# Patient Record
Sex: FEMALE | Race: WHITE | NOT HISPANIC OR LATINO | Employment: PART TIME | URBAN - METROPOLITAN AREA
[De-identification: names, ages, dates, MRNs, and addresses within clinical notes are randomized per-mention and may not be internally consistent; named-entity substitution may affect disease eponyms.]

---

## 2022-10-17 ENCOUNTER — OFFICE VISIT (OUTPATIENT)
Dept: INTERNAL MEDICINE | Facility: CLINIC | Age: 22
End: 2022-10-17

## 2022-10-17 VITALS
SYSTOLIC BLOOD PRESSURE: 118 MMHG | HEIGHT: 64 IN | HEART RATE: 68 BPM | TEMPERATURE: 97.1 F | DIASTOLIC BLOOD PRESSURE: 83 MMHG | WEIGHT: 179 LBS | BODY MASS INDEX: 30.56 KG/M2 | OXYGEN SATURATION: 100 %

## 2022-10-17 DIAGNOSIS — J31.0 CHRONIC RHINITIS: ICD-10-CM

## 2022-10-17 DIAGNOSIS — Z76.89 ENCOUNTER TO ESTABLISH CARE: Primary | ICD-10-CM

## 2022-10-17 PROBLEM — N94.6 DYSMENORRHEA: Status: ACTIVE | Noted: 2018-02-20

## 2022-10-17 PROBLEM — F41.9 ANXIETY: Status: ACTIVE | Noted: 2021-07-20

## 2022-10-17 PROBLEM — K21.9 GASTROESOPHAGEAL REFLUX DISEASE: Status: ACTIVE | Noted: 2018-02-20

## 2022-10-17 PROBLEM — L98.9 FINGER LESION: Status: ACTIVE | Noted: 2018-02-20

## 2022-10-17 PROBLEM — R20.0 RIGHT FACIAL NUMBNESS: Status: ACTIVE | Noted: 2018-02-21

## 2022-10-17 PROCEDURE — 99203 OFFICE O/P NEW LOW 30 MIN: CPT | Performed by: NURSE PRACTITIONER

## 2022-10-17 RX ORDER — FLUTICASONE PROPIONATE 50 MCG
2 SPRAY, SUSPENSION (ML) NASAL DAILY
Qty: 9.9 ML | Refills: 2 | Status: SHIPPED | OUTPATIENT
Start: 2022-10-17 | End: 2023-01-06

## 2022-10-17 RX ORDER — SERTRALINE HYDROCHLORIDE 100 MG/1
TABLET, FILM COATED ORAL
COMMUNITY
Start: 2022-09-02 | End: 2022-10-17

## 2022-10-17 RX ORDER — LORATADINE 10 MG/1
CAPSULE, LIQUID FILLED ORAL
COMMUNITY
End: 2022-10-17

## 2022-10-17 RX ORDER — NORETHINDRONE ACETATE AND ETHINYL ESTRADIOL 1; .02 MG/1; MG/1
TABLET ORAL
COMMUNITY
Start: 2021-05-03 | End: 2023-01-06

## 2022-10-17 RX ORDER — LEVOCETIRIZINE DIHYDROCHLORIDE 5 MG/1
5 TABLET, FILM COATED ORAL EVERY EVENING
Qty: 30 TABLET | Refills: 2 | Status: SHIPPED | OUTPATIENT
Start: 2022-10-17 | End: 2023-03-08

## 2022-10-17 NOTE — PROGRESS NOTES
Chief Complaint  Breathing Problem (Establish care new patient //Difficulty breathing out of nose )     Subjective:      History of Present Illness {CC  Problem List  Visit  Diagnosis   Encounters  Notes  Medications  Labs  Result Review Imaging  Media :23}     Susan Contreras presents to [facility] for:      She is new to me.   She has been seen at [facility] in the past.     PmHX: anxiety (zoloft in the past), allergies, GERD    She is here today due to chronic nasal congestion. This has been on going for years.   Lives with boyfriend that had 2 cats - now has 5 cats and she has been allergic to cats.   Denies fever, chills, HA, cough, no ear pain.    She has been taking claritin and using Arm and Hammer saline spray.   States it is difficult for her to breath at night.        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      Objective:      Physical Exam  Constitutional:       Appearance: Normal appearance.   HENT:      Nose: Rhinorrhea (clear) present.      Comments: Turbinates pale, boggy  No polyps seen distally      Mouth/Throat:      Mouth: Mucous membranes are dry.      Pharynx: Oropharynx is clear. No oropharyngeal exudate or posterior oropharyngeal erythema.   Eyes:      Conjunctiva/sclera: Conjunctivae normal.      Pupils: Pupils are equal, round, and reactive to light.   Cardiovascular:      Rate and Rhythm: Normal rate.      Pulses: Normal pulses.   Musculoskeletal:      Cervical back: Normal range of motion.   Lymphadenopathy:      Cervical: No cervical adenopathy.   Neurological:      Mental Status: She is alert and oriented to person, place, and time.        Result Review  Data Reviewed:{ Labs  Result Review  Imaging  Med Tab  Media :23}                Vital Signs:   /83 (BP Location:  "Left arm, Patient Position: Sitting, Cuff Size: Adult)   Pulse 68   Temp 97.1 °F (36.2 °C) (Temporal)   Ht 162.6 cm (64\")   Wt 81.2 kg (179 lb)   SpO2 100%   BMI 30.73 kg/m²         Requested Prescriptions     Signed Prescriptions Disp Refills   • levocetirizine (XYZAL) 5 MG tablet 30 tablet 2     Sig: Take 1 tablet by mouth Every Evening.   • fluticasone (Flonase) 50 MCG/ACT nasal spray 9.9 mL 2     Si sprays into the nostril(s) as directed by provider Daily.       Routine medications provided by this office will also be refilled via pharmacy request.       Current Outpatient Medications:   •  norethindrone-ethinyl estradiol (MICROGESTIN ) 1-20 MG-MCG per tablet, , Disp: , Rfl:   •  fluticasone (Flonase) 50 MCG/ACT nasal spray, 2 sprays into the nostril(s) as directed by provider Daily., Disp: 9.9 mL, Rfl: 2  •  levocetirizine (XYZAL) 5 MG tablet, Take 1 tablet by mouth Every Evening., Disp: 30 tablet, Rfl: 2     Assessment and Plan:      Assessment and Plan {CC Problem List  Visit Diagnosis  ROS  Review (Popup)  Health Maintenance  Quality  BestPractice  Medications  SmartSets  SnapShot Encounters  Media :23}     Problem List Items Addressed This Visit    None  Visit Diagnoses     Encounter to establish care    -  Primary    Chronic rhinitis        Relevant Medications    levocetirizine (XYZAL) 5 MG tablet    fluticasone (Flonase) 50 MCG/ACT nasal spray    Other Relevant Orders    Ambulatory Referral to ENT (Otolaryngology)        Will have her to take flonase and stop claritin and change to xyzal at bedtime.  And to wipe down nasal spray with alcohol.   I gave her a sample of benzedrex nasal decongestant.     Will refer to ENT for further evaluation.     Follow Up {Instructions Charge Capture  Follow-up Communications :23}     Return in about 3 months (around 2023) for Annual physical.      Patient was given instructions and counseling regarding her condition or for health " maintenance advice. Please see specific information pulled into the AVS if appropriate.    Teressaon disclaimer:   Much of this encounter note is an electronic transcription/translation of spoken language to printed text. The electronic translation of spoken language may permit erroneous, or at times, nonsensical words or phrases to be inadvertently transcribed; Although I have reviewed the note for such errors, some may still exist.     Additional Patient Counseling:       Patient Instructions       October is Breast Cancer Awareness Month  Each year more than 245,000 women get breast cancer in the [place].  Each year approximately 2,650 men are diagnosed with breast cancer.     Monitor your breast for changes  • Any change in the size or the shape of the breast  • Pain in any area of the breast  • Nipple discharge other than breast milk (including blood)  • A new lump in the breast or underarm  *Continue regular scheduled mammograms    Diet:    • Eat vegetables, fruits, whole grain, low-fat dairy, poultry, fish, beans, nontropical vegetable oils, and nuts, but low amounts of red meat (i.e., Mediterranean-style diet, DASH [Dietary Approaches to Stop Hypertension] diet).  • Limit sugary drinks and sweets.  • Limit saturated and trans fat to 5% to 6% of calories.  • Limit sodium intake to 2,400 mg daily (about one teaspoon table salt [kosher/sea salt have less sodium per teaspoon]).  • Fad diets will come and go.  Studies show that the most effective diet is one that you can continue long term.     Weight loss / Calorie Counting Apps:    • Lose It!   • MyFitness Pal   • Works great when you try it with a partner/ friend    Exercise:   • Engage in moderate-to-vigorous aerobic activity for at least 40 minutes (on average) three to four times each week.    Wearables:   • Activity tracker   • Step tracker: getting 7,500 steps daily can cut your cardiac risks by 44%     Bone Health:    • Https://www.nof.org/patients/treatment/nutrition/  • Routine weight bearing exercise    Vaccines:   • Flu vaccine every fall  • https://www.vaccinateyourfamily.org/        COVID booster recommended.   COVID resources: https://PriceMDs.comstatus.QWiPS/docxese71

## 2022-10-17 NOTE — PATIENT INSTRUCTIONS
October is Breast Cancer Awareness Month  Each year more than 245,000 women get breast cancer in the [place].  Each year approximately 2,650 men are diagnosed with breast cancer.     Monitor your breast for changes  Any change in the size or the shape of the breast  Pain in any area of the breast  Nipple discharge other than breast milk (including blood)  A new lump in the breast or underarm  *Continue regular scheduled mammograms    Diet:    Eat vegetables, fruits, whole grain, low-fat dairy, poultry, fish, beans, nontropical vegetable oils, and nuts, but low amounts of red meat (i.e., Mediterranean-style diet, DASH [Dietary Approaches to Stop Hypertension] diet).  Limit sugary drinks and sweets.  Limit saturated and trans fat to 5% to 6% of calories.  Limit sodium intake to 2,400 mg daily (about one teaspoon table salt [kosher/sea salt have less sodium per teaspoon]).  Fad diets will come and go.  Studies show that the most effective diet is one that you can continue long term.     Weight loss / Calorie Counting Apps:    Lose It!   [employer] Pal   Works great when you try it with a partner/ friend    Exercise:   Engage in moderate-to-vigorous aerobic activity for at least 40 minutes (on average) three to four times each week.    Wearables:   Activity tracker   Step tracker: getting 7,500 steps daily can cut your cardiac risks by 44%     Bone Health:   Https://www.nof.org/patients/treatment/nutrition/  Routine weight bearing exercise    Vaccines:   Flu vaccine every fall  https://www.vaccinateyourfamily.org/        COVID booster recommended.   COVID resources: https://govstatus.IvyDate/iwdwujs19

## 2022-11-02 ENCOUNTER — TELEPHONE (OUTPATIENT)
Dept: INTERNAL MEDICINE | Facility: CLINIC | Age: 22
End: 2022-11-02

## 2022-11-02 NOTE — TELEPHONE ENCOUNTER
UNABLE TO WARM TRANSFER    Caller: Susan Contreras    Relationship to patient: Self    Patient is needing: PATIENT IS CHECKING STATUS OF ENT REFERRAL. PLEASE ANNELISE [phone]

## 2023-01-06 ENCOUNTER — OFFICE VISIT (OUTPATIENT)
Dept: INTERNAL MEDICINE | Facility: CLINIC | Age: 23
End: 2023-01-06
Payer: COMMERCIAL

## 2023-01-06 VITALS
HEART RATE: 95 BPM | HEIGHT: 64 IN | SYSTOLIC BLOOD PRESSURE: 118 MMHG | DIASTOLIC BLOOD PRESSURE: 76 MMHG | OXYGEN SATURATION: 99 % | WEIGHT: 176.5 LBS | BODY MASS INDEX: 30.13 KG/M2

## 2023-01-06 DIAGNOSIS — K62.89 RECTAL CYST: Primary | ICD-10-CM

## 2023-01-06 PROBLEM — E66.9 OBESITY WITH BODY MASS INDEX 30 OR GREATER: Status: ACTIVE | Noted: 2023-01-06

## 2023-01-06 PROCEDURE — 99213 OFFICE O/P EST LOW 20 MIN: CPT | Performed by: NURSE PRACTITIONER

## 2023-01-06 RX ORDER — METRONIDAZOLE 7.5 MG/G
GEL VAGINAL
COMMUNITY
Start: 2022-11-30 | End: 2023-01-10

## 2023-01-06 RX ORDER — SULFAMETHOXAZOLE AND TRIMETHOPRIM 800; 160 MG/1; MG/1
1 TABLET ORAL 2 TIMES DAILY
Qty: 14 TABLET | Refills: 0 | Status: SHIPPED | OUTPATIENT
Start: 2023-01-06 | End: 2023-01-13

## 2023-01-06 RX ORDER — NORETHINDRONE ACETATE AND ETHINYL ESTRADIOL 1; .02 MG/1; MG/1
TABLET ORAL
COMMUNITY
Start: 2021-05-02 | End: 2023-01-10

## 2023-01-06 RX ORDER — AZELASTINE HYDROCHLORIDE 137 UG/1
SPRAY, METERED NASAL
COMMUNITY
Start: 2022-12-15

## 2023-01-06 NOTE — PROGRESS NOTES
Chief Complaint  Mass (2 weeks )     Subjective:      History of Present Illness {CC  Problem List  Visit  Diagnosis   Encounters  Notes  Medications  Labs  Result Review Imaging  Media :23}     Susan Contreras presents to [facility] for:          Feels bump above rectum: she has searched and has concern for pilonidal cyst.   No fever/ chills.   No change in BMs.      Her mother is in exam room and asks for her to be present.     Answers for HPI/ROS submitted by the patient on 1/2/2023  Please describe your symptoms.: I feel a bump in between the buttcrack, slightly above the butthole. It is causing a little discomfort and I am concerned about it.  Have you had these symptoms before?: No  How long have you been having these symptoms?: 1-2 weeks  What is the primary reason for your visit?: Other          I have reviewed patient's medical history, any new submitted information provided by patient or medical assistant and updated medical record.      Objective:      Physical Exam  Cardiovascular:      Rate and Rhythm: Normal rate.      Pulses: Normal pulses.   Genitourinary:      Neurological:      Mental Status: She is alert and oriented to person, place, and time.        Result Review  Data Reviewed:{ Labs  Result Review  Imaging  Med Tab  Media :23}                Vital Signs:   /76 (BP Location: Left arm, Patient Position: Sitting, Cuff Size: Adult)   Pulse 95   Ht 162.6 cm (64\")   Wt 80.1 kg (176 lb 8 oz)   SpO2 99%   BMI 30.30 kg/m²         Requested Prescriptions     Signed Prescriptions Disp Refills   • sulfamethoxazole-trimethoprim (Bactrim DS) 800-160 MG per tablet 14 tablet 0     Sig: Take 1 tablet by mouth 2 (Two) Times a Day for 7 days.       Routine medications provided by this office will also be refilled via pharmacy request.       Current Outpatient Medications:   •  Azelastine HCl 137 MCG/SPRAY solution, SPRAY 1 SPRAY EACH NOSTRIL TWICE  A DAY, Disp: , Rfl:   •  levocetirizine (XYZAL) 5 MG tablet, Take 1 tablet by mouth Every Evening., Disp: 30 tablet, Rfl: 2  •  metroNIDAZOLE (METROGEL) 0.75 % vaginal gel, INSERT 1 APPLICATORFUL VAGINALLY FOR 5 NIGHTS, Disp: , Rfl:   •  norethindrone-ethinyl estradiol (MICROGESTIN 1/20) 1-20 MG-MCG per tablet, , Disp: , Rfl:   •  sulfamethoxazole-trimethoprim (Bactrim DS) 800-160 MG per tablet, Take 1 tablet by mouth 2 (Two) Times a Day for 7 days., Disp: 14 tablet, Rfl: 0     Assessment and Plan:      Assessment and Plan {CC Problem List  Visit Diagnosis  ROS  Review (Popup)  Health Maintenance  Quality  BestPractice  Medications  SmartSets  SnapShot Encounters  Media :23}     Problem List Items Addressed This Visit    None  Visit Diagnoses     Rectal cyst    -  Primary    Relevant Medications    sulfamethoxazole-trimethoprim (Bactrim DS) 800-160 MG per tablet          Advised sitz bath.   Toilet hygiene: limit time on toilet   High fiber diet.       Follow Up {Instructions Charge Capture  Follow-up Communications :23}     Return if symptoms worsen or fail to improve, for Next scheduled follow up.      Patient was given instructions and counseling regarding her condition or for health maintenance advice. Please see specific information pulled into the AVS if appropriate.    Nahid disclaimer:   Much of this encounter note is an electronic transcription/translation of spoken language to printed text. The electronic translation of spoken language may permit erroneous, or at times, nonsensical words or phrases to be inadvertently transcribed; Although I have reviewed the note for such errors, some may still exist.     Additional Patient Counseling:       There are no Patient Instructions on file for this visit.

## 2023-01-10 DIAGNOSIS — J31.0 CHRONIC RHINITIS: ICD-10-CM

## 2023-01-10 RX ORDER — FLUTICASONE PROPIONATE 50 MCG
SPRAY, SUSPENSION (ML) NASAL
Qty: 16 ML | Refills: 2 | OUTPATIENT
Start: 2023-01-10

## 2023-01-17 ENCOUNTER — OFFICE VISIT (OUTPATIENT)
Dept: INTERNAL MEDICINE | Facility: CLINIC | Age: 23
End: 2023-01-17
Payer: COMMERCIAL

## 2023-01-17 VITALS
OXYGEN SATURATION: 99 % | BODY MASS INDEX: 30.3 KG/M2 | HEIGHT: 64 IN | HEART RATE: 70 BPM | TEMPERATURE: 98.2 F | DIASTOLIC BLOOD PRESSURE: 70 MMHG | SYSTOLIC BLOOD PRESSURE: 120 MMHG | WEIGHT: 177.5 LBS

## 2023-01-17 DIAGNOSIS — Z11.59 NEED FOR HEPATITIS C SCREENING TEST: ICD-10-CM

## 2023-01-17 DIAGNOSIS — Z13.220 ENCOUNTER FOR LIPID SCREENING FOR CARDIOVASCULAR DISEASE: ICD-10-CM

## 2023-01-17 DIAGNOSIS — Z00.00 ANNUAL PHYSICAL EXAM: Primary | ICD-10-CM

## 2023-01-17 DIAGNOSIS — Z13.6 ENCOUNTER FOR LIPID SCREENING FOR CARDIOVASCULAR DISEASE: ICD-10-CM

## 2023-01-17 DIAGNOSIS — Z13.228 ENCOUNTER FOR SCREENING FOR METABOLIC DISORDER: ICD-10-CM

## 2023-01-17 DIAGNOSIS — Z13.6 SCREENING FOR HYPERTENSION: ICD-10-CM

## 2023-01-17 PROBLEM — Z91.09 ENVIRONMENTAL ALLERGIES: Status: ACTIVE | Noted: 2023-01-17

## 2023-01-17 PROCEDURE — 99395 PREV VISIT EST AGE 18-39: CPT | Performed by: NURSE PRACTITIONER

## 2023-01-17 RX ORDER — NORETHINDRONE ACETATE AND ETHINYL ESTRADIOL 1; .02 MG/1; MG/1
TABLET ORAL
COMMUNITY
Start: 2021-05-03

## 2023-01-17 NOTE — PROGRESS NOTES
Chief Complaint  Annual Exam     Subjective:      History of Present Illness {CC  Problem List  Visit  Diagnosis   Encounters  Notes  Medications  Labs  Result Review Imaging  Media :23}     Susan Contreras presents to [facility] for:  Annual exam excluding GYN exam.     LV: rectal cyst - states resolved with abx.  Did not do sitz bath.     She is following with ENT for allergies.     Susan is here for coordination of medical care, to discuss health maintenance, disease prevention as well as to followup on medical problems.     Patient Care Team:  Adonay London III, NP-C as PCP - General (Internal Medicine)  Kellie Ma MD as Consulting Physician (Gynecology)  Ml Pope MD as Consulting Physician (Otolaryngology)     Activity level is moderate.   Exercises 2 per week.     Hiking when warmer.     Weight trend is     Wt Readings from Last 4 Encounters:   01/17/23 80.5 kg (177 lb 8 oz)   01/06/23 80.1 kg (176 lb 8 oz)   10/17/22 81.2 kg (179 lb)         Health Maintenance Female:    · GYN: Anil   · Advised routine self-breast exams monthly.  · Sexually active:   · Pap smears have been: WNL Per patient: last 10/2022:   · No family hx of breast cancer  · Unsure is she has had gardasil       Colon cancer screen:   Colonoscopy due at 43 yoa unless clinically indicated before.     Vaccines: may need gardasil - she will check with GYN    Advised regular sunscreen.      I have reviewed patient's medical history, any new submitted information provided by patient or medical assistant and updated medical record.      Objective:      Physical Exam  Vitals reviewed.   Constitutional:       Appearance: Normal appearance. She is well-developed.   HENT:      Head: Normocephalic.      Nose: Nose normal.      Mouth/Throat:      Pharynx: Uvula midline.   Eyes:      Conjunctiva/sclera: Conjunctivae normal.      Pupils: Pupils are equal, round, and reactive to  "light.   Neck:      Thyroid: No thyromegaly.   Cardiovascular:      Rate and Rhythm: Normal rate and regular rhythm.      Pulses: Normal pulses.      Heart sounds: Normal heart sounds, S1 normal and S2 normal. No murmur heard.  Pulmonary:      Effort: Pulmonary effort is normal.      Breath sounds: Normal breath sounds.   Chest:      Chest wall: No deformity.   Abdominal:      General: Bowel sounds are normal.      Palpations: Abdomen is soft.      Tenderness: There is no abdominal tenderness. Negative signs include Tyler's sign.   Musculoskeletal:         General: Normal range of motion.      Cervical back: Normal range of motion and neck supple.      Right lower leg: No edema.      Left lower leg: No edema.   Lymphadenopathy:      Cervical: No cervical adenopathy.   Skin:     General: Skin is warm and dry.      Capillary Refill: Capillary refill takes 2 to 3 seconds.   Neurological:      Mental Status: She is alert and oriented to person, place, and time.      Cranial Nerves: No cranial nerve deficit.      Sensory: No sensory deficit.      Coordination: Coordination normal.   Psychiatric:         Mood and Affect: Mood normal.         Speech: Speech normal.         Behavior: Behavior normal. Behavior is cooperative.         Thought Content: Thought content normal.         Judgment: Judgment normal.        Result Review  Data Reviewed:{ Labs  Result Review  Imaging  Med Tab  Media :23}                Vital Signs:   /70 (BP Location: Left arm, Patient Position: Sitting, Cuff Size: Adult)   Pulse 70   Temp 98.2 °F (36.8 °C) (Temporal)   Ht 162.6 cm (64\")   Wt 80.5 kg (177 lb 8 oz)   SpO2 99%   BMI 30.47 kg/m²         Requested Prescriptions      No prescriptions requested or ordered in this encounter       Routine medications provided by this office will also be refilled via pharmacy request.       Current Outpatient Medications:   •  Azelastine HCl 137 MCG/SPRAY solution, SPRAY 1 SPRAY EACH NOSTRIL " TWICE A DAY, Disp: , Rfl:   •  levocetirizine (XYZAL) 5 MG tablet, Take 1 tablet by mouth Every Evening., Disp: 30 tablet, Rfl: 2  •  norethindrone-ethinyl estradiol (MICROGESTIN 1/20) 1-20 MG-MCG per tablet, , Disp: , Rfl:      Assessment and Plan:      Assessment and Plan {CC Problem List  Visit Diagnosis  ROS  Review (Popup)  Health Maintenance  Quality  BestPractice  Medications  SmartSets  SnapShot Encounters  Media :23}     Problem List Items Addressed This Visit    None  Visit Diagnoses     Annual physical exam    -  Primary    Relevant Orders    Comprehensive Metabolic Panel    CBC (No Diff)    Screening for hypertension        Advised low sodium diet, routine exercise     Need for hepatitis C screening test        Relevant Orders    Hepatitis C Antibody    Encounter for lipid screening for cardiovascular disease        Relevant Orders    Lipid Panel With LDL / HDL Ratio    Encounter for screening for metabolic disorder        Relevant Orders    TSH Rfx On Abnormal To Free T4          Follow Up {Instructions Charge Capture  Follow-up Communications :23}     Return in about 1 year (around 1/17/2024) for Annual physical.      Patient was given instructions and counseling regarding her condition or for health maintenance advice. Please see specific information pulled into the AVS if appropriate.    Dragon disclaimer:   Much of this encounter note is an electronic transcription/translation of spoken language to printed text. The electronic translation of spoken language may permit erroneous, or at times, nonsensical words or phrases to be inadvertently transcribed; Although I have reviewed the note for such errors, some may still exist.     Additional Patient Counseling:       Patient Instructions   Diet:    • Eat vegetables, fruits, whole grain, low-fat dairy, poultry, fish, beans, nontropical vegetable oils, and nuts, but avoid red meat (i.e., Mediterranean-style diet, DASH [Dietary Approaches  to Stop Hypertension] diet).  • Limit sugary drinks and sweets.  • Limit saturated and trans fat to 5% to 6% of calories.  • Limit sodium intake to 2,400 mg daily (about one teaspoon table salt [kosher/sea salt have less sodium per teaspoon]).    Weight loss / Calorie Counting Apps:    • Lose It!   • [employer] [place] Pal     Exercise:   • Engage in moderate-to-vigorous aerobic activity for at least 40 minutes (on average) three to four times each week.    Wearables:   • Activity tracker   • Step tracker     Skin Care:   • Use sun screen SPF >30 daily  • Dermatologist for skin check regularly    Bone Health:   • Https://[email or web address]/patients/treatment/nutrition/    Mental Health:       CDC recommends Flu vaccines for everyone 6 months and older EVERY season with rare exceptions.

## 2023-01-17 NOTE — PATIENT INSTRUCTIONS
Diet:    Eat vegetables, fruits, whole grain, low-fat dairy, poultry, fish, beans, nontropical vegetable oils, and nuts, but avoid red meat (i.e., Mediterranean-style diet, DASH [Dietary Approaches to Stop Hypertension] diet).  Limit sugary drinks and sweets.  Limit saturated and trans fat to 5% to 6% of calories.  Limit sodium intake to 2,400 mg daily (about one teaspoon table salt [kosher/sea salt have less sodium per teaspoon]).    Weight loss / Calorie Counting Apps:    Lose It!   [employer] Pal     Exercise:   Engage in moderate-to-vigorous aerobic activity for at least 40 minutes (on average) three to four times each week.    Wearables:   Activity tracker   Step tracker     Skin Care:   Use sun screen SPF >30 daily  Dermatologist for skin check regularly    Bone Health:   Https://[email or web address]/patients/treatment/[email or web address]    Mental Health:       CDC recommends Flu vaccines for everyone 6 months and older EVERY season with rare exceptions.

## 2023-01-18 LAB
ALBUMIN SERPL-MCNC: 4.5 G/DL (ref 3.5–5.2)
ALBUMIN/GLOB SERPL: 1.8 G/DL
ALP SERPL-CCNC: 46 U/L (ref 39–117)
ALT SERPL-CCNC: 18 U/L (ref 1–33)
AST SERPL-CCNC: 19 U/L (ref 1–32)
BILIRUB SERPL-MCNC: 0.3 MG/DL (ref 0–1.2)
BUN SERPL-MCNC: 17 MG/DL (ref 6–20)
BUN/CREAT SERPL: 33.3 (ref 7–25)
CALCIUM SERPL-MCNC: 9.1 MG/DL (ref 8.6–10.5)
CHLORIDE SERPL-SCNC: 103 MMOL/L (ref 98–107)
CHOLEST SERPL-MCNC: 260 MG/DL (ref 0–200)
CO2 SERPL-SCNC: 24 MMOL/L (ref 22–29)
CREAT SERPL-MCNC: 0.51 MG/DL (ref 0.57–1)
EGFRCR SERPLBLD CKD-EPI 2021: 133.9 ML/MIN/1.73
ERYTHROCYTE [DISTWIDTH] IN BLOOD BY AUTOMATED COUNT: 12.3 % (ref 12.3–15.4)
GLOBULIN SER CALC-MCNC: 2.5 GM/DL
GLUCOSE SERPL-MCNC: 82 MG/DL (ref 65–99)
HCT VFR BLD AUTO: 36.3 % (ref 34–46.6)
HCV AB S/CO SERPL IA: <0.1 S/CO RATIO (ref 0–0.9)
HDLC SERPL-MCNC: 48 MG/DL (ref 40–60)
HGB BLD-MCNC: 12.1 G/DL (ref 12–15.9)
LDLC SERPL CALC-MCNC: 182 MG/DL (ref 0–100)
LDLC/HDLC SERPL: 3.74 {RATIO}
MCH RBC QN AUTO: 28.7 PG (ref 26.6–33)
MCHC RBC AUTO-ENTMCNC: 33.3 G/DL (ref 31.5–35.7)
MCV RBC AUTO: 86 FL (ref 79–97)
PLATELET # BLD AUTO: 265 10*3/MM3 (ref 140–450)
POTASSIUM SERPL-SCNC: 4.1 MMOL/L (ref 3.5–5.2)
PROT SERPL-MCNC: 7 G/DL (ref 6–8.5)
RBC # BLD AUTO: 4.22 10*6/MM3 (ref 3.77–5.28)
SODIUM SERPL-SCNC: 134 MMOL/L (ref 136–145)
TRIGL SERPL-MCNC: 163 MG/DL (ref 0–150)
TSH SERPL DL<=0.005 MIU/L-ACNC: 3.29 UIU/ML (ref 0.27–4.2)
VLDLC SERPL CALC-MCNC: 30 MG/DL (ref 5–40)
WBC # BLD AUTO: 4.41 10*3/MM3 (ref 3.4–10.8)

## 2023-03-08 DIAGNOSIS — J31.0 CHRONIC RHINITIS: ICD-10-CM

## 2023-03-08 RX ORDER — LEVOCETIRIZINE DIHYDROCHLORIDE 5 MG/1
TABLET, FILM COATED ORAL
Qty: 90 TABLET | Refills: 1 | Status: SHIPPED | OUTPATIENT
Start: 2023-03-08

## 2023-06-05 ENCOUNTER — OFFICE VISIT (OUTPATIENT)
Dept: INTERNAL MEDICINE | Facility: CLINIC | Age: 23
End: 2023-06-05
Payer: COMMERCIAL

## 2023-06-05 VITALS
OXYGEN SATURATION: 100 % | WEIGHT: 167 LBS | HEIGHT: 64 IN | DIASTOLIC BLOOD PRESSURE: 76 MMHG | SYSTOLIC BLOOD PRESSURE: 121 MMHG | HEART RATE: 74 BPM | BODY MASS INDEX: 28.51 KG/M2

## 2023-06-05 DIAGNOSIS — L23.7 POISON IVY DERMATITIS: Primary | ICD-10-CM

## 2023-06-05 RX ORDER — METHYLPREDNISOLONE 4 MG/1
TABLET ORAL
Qty: 21 EACH | Refills: 0 | Status: SHIPPED | OUTPATIENT
Start: 2023-06-05

## 2023-06-05 RX ORDER — METHYLPREDNISOLONE SODIUM SUCCINATE 125 MG/2ML
125 INJECTION, POWDER, LYOPHILIZED, FOR SOLUTION INTRAMUSCULAR; INTRAVENOUS ONCE
Status: COMPLETED | OUTPATIENT
Start: 2023-06-05 | End: 2023-06-05

## 2023-06-05 RX ORDER — METHYLPREDNISOLONE SODIUM SUCCINATE 125 MG/2ML
80 INJECTION, POWDER, LYOPHILIZED, FOR SOLUTION INTRAMUSCULAR; INTRAVENOUS ONCE
Status: DISCONTINUED | OUTPATIENT
Start: 2023-06-05 | End: 2023-06-05

## 2023-06-05 RX ADMIN — METHYLPREDNISOLONE SODIUM SUCCINATE 125 MG: 125 INJECTION, POWDER, LYOPHILIZED, FOR SOLUTION INTRAMUSCULAR; INTRAVENOUS at 13:46

## 2023-06-05 NOTE — ASSESSMENT & PLAN NOTE
Steroid injection today in office  Medrol pack as ordered  Continue on daily antihistamine  Pepcid 20mg daily x 5 days  Oatmeal baths daily PRN  Can continue with calamine PRN

## 2023-06-05 NOTE — PROGRESS NOTES
"Chief Complaint  Poison Ivy    Subjective        Susan Contreras presents to [facility]  History of Present Illness  This is a 23 y/o female presenting to office for f/u with poison ivy dermatitis. Patient reports symptoms started 3 weeks ago; has lesions present on right side of jaw, BUE, and BLE. Patient reports she finished her medrol pack about 1 week ago. Patient also has been using OTC poison ivy tx in a skin scrub. Patient continues with itching. Patient continues with calamine spray.     Objective   Vital Signs:  /76 (BP Location: Left arm, Patient Position: Sitting, Cuff Size: Adult)   Pulse 74   Ht 162.6 cm (64\")   Wt 75.8 kg (167 lb)   SpO2 100%   BMI 28.67 kg/m²   Estimated body mass index is 28.67 kg/m² as calculated from the following:    Height as of this encounter: 162.6 cm (64\").    Weight as of this encounter: 75.8 kg (167 lb).             Physical Exam  Constitutional:       Appearance: Normal appearance.   HENT:      Head: Normocephalic and atraumatic.      Right Ear: External ear normal.      Left Ear: External ear normal.   Eyes:      Conjunctiva/sclera: Conjunctivae normal.      Pupils: Pupils are equal, round, and reactive to light.   Pulmonary:      Effort: Pulmonary effort is normal.   Musculoskeletal:      Cervical back: Normal range of motion and neck supple.   Skin:     Findings: Erythema, lesion and rash present.      Comments: Papular lesion noted to jaw, BUE, BLE; mild redness, mild drainage noted.    Neurological:      General: No focal deficit present.      Mental Status: She is alert and oriented to person, place, and time. Mental status is at baseline.   Psychiatric:         Mood and Affect: Mood normal.         Thought Content: Thought content normal.         Judgment: Judgment normal.      Result Review :  The following data was reviewed by: NEVA Farias on 06/05/2023:  Common labs          1/17/2023    11:10   Common Labs "   Glucose 82    BUN 17    Creatinine 0.51    Sodium 134    Potassium 4.1    Chloride 103    Calcium 9.1    Total Protein 7.0    Albumin 4.5    Total Bilirubin 0.3    Alkaline Phosphatase 46    AST (SGOT) 19    ALT (SGPT) 18    WBC 4.41    Hemoglobin 12.1    Hematocrit 36.3    Platelets 265    Total Cholesterol 260    Triglycerides 163    HDL Cholesterol 48    LDL Cholesterol  182                   Assessment and Plan   Diagnoses and all orders for this visit:    1. Poison ivy dermatitis (Primary)  Assessment & Plan:  Steroid injection today in office  Medrol pack as ordered  Continue on daily antihistamine  Pepcid 20mg daily x 5 days  Oatmeal baths daily PRN  Can continue with calamine PRN     Orders:  -     Discontinue: methylPREDNISolone sodium succinate (SOLU-Medrol) injection 80 mg  -     methylPREDNISolone (MEDROL) 4 MG dose pack; Take as directed on package instructions.  Dispense: 21 each; Refill: 0  -     methylPREDNISolone sodium succinate (SOLU-Medrol) injection 125 mg             Follow Up   Return if symptoms worsen or fail to improve.  Patient was given instructions and counseling regarding her condition or for health maintenance advice. Please see specific information pulled into the AVS if appropriate.

## 2023-07-17 PROBLEM — Z91.09 ENVIRONMENTAL ALLERGIES: Chronic | Status: ACTIVE | Noted: 2023-01-17

## 2023-08-30 DIAGNOSIS — J31.0 CHRONIC RHINITIS: ICD-10-CM

## 2023-08-30 RX ORDER — LEVOCETIRIZINE DIHYDROCHLORIDE 5 MG/1
TABLET, FILM COATED ORAL
Qty: 90 TABLET | Refills: 1 | Status: SHIPPED | OUTPATIENT
Start: 2023-08-30

## 2023-09-28 DIAGNOSIS — Z91.09 ENVIRONMENTAL ALLERGIES: Primary | Chronic | ICD-10-CM

## 2023-09-28 RX ORDER — AZELASTINE HYDROCHLORIDE 137 UG/1
1 SPRAY, METERED NASAL 2 TIMES DAILY
Qty: 30 ML | Refills: 0 | Status: SHIPPED | OUTPATIENT
Start: 2023-09-28

## 2023-09-28 NOTE — TELEPHONE ENCOUNTER
Caller: Susan Contreras    Relationship: Self    Best call back number: [phone]    Requested Prescriptions:   Requested Prescriptions     Pending Prescriptions Disp Refills    Azelastine HCl 137 MCG/SPRAY solution          Pharmacy where request should be sent: [facility]Y #[id number] - [place], [place] - [address] AT [facility] - [phone]  - [phone] FX     Last office visit with prescribing clinician: 7/17/2023   Last telemedicine visit with prescribing clinician: Visit date not found   Next office visit with prescribing clinician: 1/19/2024     Does the patient have less than a 3 day supply:  [] Yes  [x] No    Would you like a call back once the refill request has been completed: [x] Yes [] No    If the office needs to give you a call back, can they leave a voicemail: [x] Yes [] No    Elliot Quiroga Rep   09/28/23 09:51 EDT

## 2023-09-28 NOTE — TELEPHONE ENCOUNTER
Rx Refill Note  Requested Prescriptions     Pending Prescriptions Disp Refills    Azelastine HCl 137 MCG/SPRAY solution        Last office visit with prescribing clinician: 7/17/2023   Last telemedicine visit with prescribing clinician: Visit date not found   Next office visit with prescribing clinician: 1/19/2024         Carmen Zhang MA  09/28/23, 10:42 EDT

## 2023-10-13 DIAGNOSIS — Z91.09 ENVIRONMENTAL ALLERGIES: ICD-10-CM

## 2023-10-13 DIAGNOSIS — Z91.09 ENVIRONMENTAL ALLERGIES: Chronic | ICD-10-CM

## 2023-10-13 RX ORDER — FLUTICASONE PROPIONATE 50 MCG
SPRAY, SUSPENSION (ML) NASAL
Qty: 48 ML | Refills: 1 | Status: SHIPPED | OUTPATIENT
Start: 2023-10-13 | End: 2023-10-13 | Stop reason: SDUPTHER

## 2023-10-13 RX ORDER — AZELASTINE HYDROCHLORIDE 137 UG/1
1 SPRAY, METERED NASAL 2 TIMES DAILY
Qty: 30 ML | Refills: 1 | Status: SHIPPED | OUTPATIENT
Start: 2023-10-13

## 2023-10-13 RX ORDER — FLUTICASONE PROPIONATE 50 MCG
2 SPRAY, SUSPENSION (ML) NASAL NIGHTLY
Qty: 48 ML | Refills: 1 | Status: SHIPPED | OUTPATIENT
Start: 2023-10-13

## 2023-10-13 NOTE — TELEPHONE ENCOUNTER
Rx Refill Note    Requested Prescriptions     Pending Prescriptions Disp Refills    Azelastine HCl 137 MCG/SPRAY solution 30 mL 0     Si spray into the nostril(s) as directed by provider 2 (Two) Times a Day.    fluticasone (FLONASE) 50 MCG/ACT nasal spray 48 mL 1      Last office visit with prescribing clinician: 2023   Last telemedicine visit with prescribing clinician: Visit date not found   Next office visit with prescribing clinician: 2024

## 2023-10-13 NOTE — TELEPHONE ENCOUNTER
Rx Refill Note  Requested Prescriptions     Pending Prescriptions Disp Refills    fluticasone (FLONASE) 50 MCG/ACT nasal spray [Pharmacy Med Name: FLUTICASONE PROP 50 MCG SPRAY] 48 mL 1     Sig: INSTILL 2 SPRAYS INTO THE NOSTRIL(S) AS DIRECTED BY PROVIDER EVERY NIGHT.      Last office visit with prescribing clinician: 7/17/2023   Last telemedicine visit with prescribing clinician: Visit date not found   Next office visit with prescribing clinician: 1/19/2024         Carmen Zhang MA  10/13/23, 09:37 EDT

## 2023-10-24 ENCOUNTER — TELEPHONE (OUTPATIENT)
Dept: INTERNAL MEDICINE | Facility: CLINIC | Age: 23
End: 2023-10-24
Payer: COMMERCIAL

## 2023-10-24 NOTE — TELEPHONE ENCOUNTER
PATIENT CALLED STATING THAT SHE WILL BE FLYING TO [place] ON 10/28/23 AND HAS BEEN ADVISED TO CONTACT HER PCP AND MAKE SURE THAT HER MEDICATION WILL NOT BE AFFECTED BY HIGH ALTITUDE.    PLEASE ADVISE  [phone]

## 2023-11-15 ENCOUNTER — OFFICE VISIT (OUTPATIENT)
Dept: INTERNAL MEDICINE | Facility: CLINIC | Age: 23
End: 2023-11-15
Payer: COMMERCIAL

## 2023-11-15 VITALS
HEART RATE: 77 BPM | DIASTOLIC BLOOD PRESSURE: 75 MMHG | HEIGHT: 64 IN | BODY MASS INDEX: 30.39 KG/M2 | OXYGEN SATURATION: 98 % | WEIGHT: 178 LBS | SYSTOLIC BLOOD PRESSURE: 121 MMHG | TEMPERATURE: 98.1 F

## 2023-11-15 DIAGNOSIS — B35.4 RINGWORM, BODY: Primary | ICD-10-CM

## 2023-11-15 PROCEDURE — 99214 OFFICE O/P EST MOD 30 MIN: CPT

## 2023-11-15 RX ORDER — NORETHINDRONE ACETATE AND ETHINYL ESTRADIOL 1; .02 MG/1; MG/1
TABLET ORAL
COMMUNITY

## 2023-11-15 RX ORDER — FLUCONAZOLE 100 MG/1
100 TABLET ORAL DAILY
Qty: 14 TABLET | Refills: 0 | Status: SHIPPED | OUTPATIENT
Start: 2023-11-15 | End: 2023-11-29

## 2023-11-15 NOTE — PATIENT INSTRUCTIONS
Body Ringworm  Body ringworm is an infection of the skin that often causes a ring-shaped rash. Body ringworm is also called tinea corporis.  Body ringworm can affect any part of your skin. This condition is easily spread from person to person (is very contagious).  What are the causes?  This condition is caused by fungi called dermatophytes. The condition develops when these fungi grow out of control on the skin.  You can get this condition if you touch a person or animal that has it. You can also get it if you share any items with an infected person or pet. These include:  Clothing, bedding, and towels.  Brushes or sharp.  Gym equipment.  Any other object that has the fungus on it.  What increases the risk?  You are more likely to develop this condition if you:  Play sports that involve close physical contact, such as wrestling.  Sweat a lot.  Live in areas that are hot and humid.  Use public showers.  Have a weakened disease-fighting system (immune system).  What are the signs or symptoms?  Symptoms of this condition include:  Itchy, raised red spots and bumps.  Red scaly patches.  A ring-shaped rash. The rash may have:  A clear center.  Scales or red bumps at its center.  Redness near its borders.  Dry and scaly skin on or around it.  How is this diagnosed?  This condition can usually be diagnosed with a skin exam. A skin scraping may be taken from the affected area and examined under a microscope to see if the fungus is present.  How is this treated?  This condition may be treated with:  An antifungal cream or ointment.  An antifungal shampoo.  Antifungal medicines. These may be prescribed if your ringworm:  Is severe.  Keeps coming back or lasts a long time.  Follow these instructions at home:  Take over-the-counter and prescription medicines only as told by your health care provider.  If you were given an antifungal cream or ointment:  Use it as told by your health care provider.  Wash the infected area and  dry it completely before applying the cream or ointment.  If you were given an antifungal shampoo:  Use it as told by your health care provider.  Leave the shampoo on your body for 3-5 minutes before rinsing.  While you have a rash:  Wear loose clothing to stop clothes from rubbing and irritating it.  Wash or change your bed sheets every night.  Wash clothes and bed sheets in hot water.  Disinfect or throw out items that may be infected.  Wash your hands often with soap and water for at least 20 seconds. If soap and water are not available, use hand .  If your pet has the same infection, take your pet to see a  for treatment.  How is this prevented?  Take a bath or shower every day and after every time you work out or play sports.  Dry your skin completely after bathing.  Wear sandals or shoes in public places and showers.  Wash athletic clothes after each use.  Do not share personal items with others.  Avoid touching red patches of skin on other people.  Avoid touching pets that have bald spots.  If you touch an animal that has a bald spot, wash your hands.  Contact a health care provider if:  Your rash continues to spread after 7 days of treatment.  Your rash is not gone in 4 weeks.  The area around your rash gets red, warm, tender, and swollen.  This information is not intended to replace advice given to you by your health care provider. Make sure you discuss any questions you have with your health care provider.  Document Revised: 06/01/2023 Document Reviewed: 06/01/2023  Elsevier Patient Education © 2023 [employer] Inc.

## 2023-11-15 NOTE — PROGRESS NOTES
Chief Complaint  Rash     Subjective:      History of Present Illness {CC  Problem List  Visit  Diagnosis   Encounters  Notes  Medications  Labs  Result Review Imaging  Media :23}     Susan Contreras presents to [facility] for:      History of Present Illness  Pt states she noticed she had a a spot on right arm 1 week ago while in [place]. When she got back she noticed the same thing on the other arm. Pt states she has not been around anyone with similar rash. Pt was recently in the ocean in [place]. Both spots itch per pt.   Rash  This is a new problem. The current episode started 1 to 4 weeks ago. The problem has been gradually worsening since onset. The affected locations include the left arm and right arm. The rash is characterized by itchiness. Pertinent negatives include no congestion, fever, joint pain or sore throat. Past treatments include topical steroids.          I have reviewed patient's medical history, any new submitted information provided by patient or medical assistant and updated medical record.      Objective:      Physical Exam  Vitals reviewed.   Constitutional:       Appearance: Normal appearance.   HENT:      Head: Normocephalic.   Cardiovascular:      Rate and Rhythm: Normal rate and regular rhythm.      Pulses: Normal pulses.      Heart sounds: Normal heart sounds.   Skin:     General: Skin is warm and dry.      Capillary Refill: Capillary refill takes less than 2 seconds.      Findings: Rash (bilateral upper arms) present. Rash is scaling.   Neurological:      General: No focal deficit present.      Mental Status: She is alert.   Psychiatric:         Mood and Affect: Mood normal.         Behavior: Behavior normal.        Result Review  Data Reviewed:{ Labs  Result Review  Imaging  Med Tab  Media :23}                Vital Signs:   /75 (BP Location: Left arm, Patient Position: Sitting, Cuff Size: Adult)   Pulse 77   Temp  "98.1 °F (36.7 °C) (Oral)   Ht 162.6 cm (64\")   Wt 80.7 kg (178 lb)   SpO2 98%   BMI 30.55 kg/m²         Requested Prescriptions     Signed Prescriptions Disp Refills    fluconazole (Diflucan) 100 MG tablet 14 tablet 0     Sig: Take 1 tablet by mouth Daily for 14 days.       Routine medications provided by this office will also be refilled via pharmacy request.       Current Outpatient Medications:     Azelastine HCl 137 MCG/SPRAY solution, 1 spray into the nostril(s) as directed by provider 2 (Two) Times a Day., Disp: 30 mL, Rfl: 1    fluticasone (FLONASE) 50 MCG/ACT nasal spray, 2 sprays into the nostril(s) as directed by provider Every Night., Disp: 48 mL, Rfl: 1    hydrOXYzine (ATARAX) 25 MG tablet, Take 1 tablet by mouth 3 (Three) Times a Day As Needed for Itching., Disp: 50 tablet, Rfl: 2    norethindrone-ethinyl estradiol (Junel 1/20) 1-20 MG-MCG per tablet, , Disp: , Rfl:     fluconazole (Diflucan) 100 MG tablet, Take 1 tablet by mouth Daily for 14 days., Disp: 14 tablet, Rfl: 0    levocetirizine (XYZAL) 5 MG tablet, TAKE 1 TABLET BY MOUTH EVERY DAY IN THE EVENING, Disp: 90 tablet, Rfl: 1    norethindrone-ethinyl estradiol (MICROGESTIN 1/20) 1-20 MG-MCG per tablet, , Disp: , Rfl:      Assessment and Plan:      Assessment and Plan {CC Problem List  Visit Diagnosis  ROS  Review (Popup)  Health Maintenance  Quality  BestPractice  Medications  SmartSets  SnapShot Encounters  Media :23}     Problem List Items Addressed This Visit    None  Visit Diagnoses       Ringworm, body    -  Primary    Relevant Medications    fluconazole (Diflucan) 100 MG tablet          Educated patient on dosing and side effects of Diflucan.  Educated patient that this starts to continue to lead to her scalp or other areas she can also use Selsun Blue as adjunct.  Educated patient on management of prevention of spreading.  Patient verbalized understanding and is comfortable with plan of care.    Follow Up {Instructions " Charge Capture  Follow-up Communications :23}     Return if symptoms worsen or fail to improve.    I spent 36 minutes caring for Susan on this date of service. This time includes time spent by me in the following activities: preparing for the visit, reviewing tests, obtaining and/or reviewing a separately obtained history, performing a medically appropriate examination and/or evaluation, counseling and educating the patient/family/caregiver, ordering medications, tests, or procedures, documenting information in the medical record, independently interpreting results and communicating that information with the patient/family/caregiver, and care coordination     Patient was given instructions and counseling regarding her condition or for health maintenance advice. Please see specific information pulled into the AVS if appropriate.    Dragon disclaimer:   Much of this encounter note is an electronic transcription/translation of spoken language to printed text. The electronic translation of spoken language may permit erroneous, or at times, nonsensical words or phrases to be inadvertently transcribed; Although I have reviewed the note for such errors, some may still exist.     Additional Patient Counseling:       Patient Instructions   Body Ringworm  Body ringworm is an infection of the skin that often causes a ring-shaped rash. Body ringworm is also called tinea corporis.  Body ringworm can affect any part of your skin. This condition is easily spread from person to person (is very contagious).  What are the causes?  This condition is caused by fungi called dermatophytes. The condition develops when these fungi grow out of control on the skin.  You can get this condition if you touch a person or animal that has it. You can also get it if you share any items with an infected person or pet. These include:  Clothing, bedding, and towels.  Brushes or sharp.  Gym equipment.  Any other object that has the fungus on  it.  What increases the risk?  You are more likely to develop this condition if you:  Play sports that involve close physical contact, such as wrestling.  Sweat a lot.  Live in areas that are hot and humid.  Use public showers.  Have a weakened disease-fighting system (immune system).  What are the signs or symptoms?  Symptoms of this condition include:  Itchy, raised red spots and bumps.  Red scaly patches.  A ring-shaped rash. The rash may have:  A clear center.  Scales or red bumps at its center.  Redness near its borders.  Dry and scaly skin on or around it.  How is this diagnosed?  This condition can usually be diagnosed with a skin exam. A skin scraping may be taken from the affected area and examined under a microscope to see if the fungus is present.  How is this treated?  This condition may be treated with:  An antifungal cream or ointment.  An antifungal shampoo.  Antifungal medicines. These may be prescribed if your ringworm:  Is severe.  Keeps coming back or lasts a long time.  Follow these instructions at home:  Take over-the-counter and prescription medicines only as told by your health care provider.  If you were given an antifungal cream or ointment:  Use it as told by your health care provider.  Wash the infected area and dry it completely before applying the cream or ointment.  If you were given an antifungal shampoo:  Use it as told by your health care provider.  Leave the shampoo on your body for 3-5 minutes before rinsing.  While you have a rash:  Wear loose clothing to stop clothes from rubbing and irritating it.  Wash or change your bed sheets every night.  Wash clothes and bed sheets in hot water.  Disinfect or throw out items that may be infected.  Wash your hands often with soap and water for at least 20 seconds. If soap and water are not available, use hand .  If your pet has the same infection, take your pet to see a  for treatment.  How is this prevented?  Take a  bath or shower every day and after every time you work out or play sports.  Dry your skin completely after bathing.  Wear sandals or shoes in public places and showers.  Wash athletic clothes after each use.  Do not share personal items with others.  Avoid touching red patches of skin on other people.  Avoid touching pets that have bald spots.  If you touch an animal that has a bald spot, wash your hands.  Contact a health care provider if:  Your rash continues to spread after 7 days of treatment.  Your rash is not gone in 4 weeks.  The area around your rash gets red, warm, tender, and swollen.  This information is not intended to replace advice given to you by your health care provider. Make sure you discuss any questions you have with your health care provider.  Document Revised: 06/01/2023 Document Reviewed: 06/01/2023  [employer] Patient Education © 2023 Elsevier Inc.

## 2023-12-26 DIAGNOSIS — Z91.09 ENVIRONMENTAL ALLERGIES: Chronic | ICD-10-CM

## 2023-12-26 RX ORDER — AZELASTINE HYDROCHLORIDE 137 UG/1
1 SPRAY, METERED NASAL 2 TIMES DAILY
Qty: 30 ML | Refills: 1 | Status: SHIPPED | OUTPATIENT
Start: 2023-12-26

## 2024-01-29 ENCOUNTER — OFFICE VISIT (OUTPATIENT)
Dept: INTERNAL MEDICINE | Facility: CLINIC | Age: 24
End: 2024-01-29
Payer: COMMERCIAL

## 2024-01-29 VITALS
WEIGHT: 185.5 LBS | BODY MASS INDEX: 31.67 KG/M2 | OXYGEN SATURATION: 99 % | SYSTOLIC BLOOD PRESSURE: 124 MMHG | HEIGHT: 64 IN | DIASTOLIC BLOOD PRESSURE: 82 MMHG | HEART RATE: 68 BPM

## 2024-01-29 DIAGNOSIS — Z13.6 SCREENING FOR HYPERTENSION: ICD-10-CM

## 2024-01-29 DIAGNOSIS — Z91.09 ENVIRONMENTAL ALLERGIES: Chronic | ICD-10-CM

## 2024-01-29 DIAGNOSIS — Z00.00 ANNUAL PHYSICAL EXAM: Primary | ICD-10-CM

## 2024-01-29 LAB
ALBUMIN SERPL-MCNC: 3.9 G/DL (ref 3.5–5.2)
ALBUMIN/GLOB SERPL: 1.5 G/DL
ALP SERPL-CCNC: 43 U/L (ref 39–117)
ALT SERPL-CCNC: 17 U/L (ref 1–33)
AST SERPL-CCNC: 14 U/L (ref 1–32)
BILIRUB SERPL-MCNC: 0.3 MG/DL (ref 0–1.2)
BUN SERPL-MCNC: 12 MG/DL (ref 6–20)
BUN/CREAT SERPL: 25 (ref 7–25)
CALCIUM SERPL-MCNC: 9.1 MG/DL (ref 8.6–10.5)
CHLORIDE SERPL-SCNC: 106 MMOL/L (ref 98–107)
CHOLEST SERPL-MCNC: 247 MG/DL (ref 0–200)
CO2 SERPL-SCNC: 23.1 MMOL/L (ref 22–29)
CREAT SERPL-MCNC: 0.48 MG/DL (ref 0.57–1)
EGFRCR SERPLBLD CKD-EPI 2021: 135 ML/MIN/1.73
ERYTHROCYTE [DISTWIDTH] IN BLOOD BY AUTOMATED COUNT: 12.1 % (ref 12.3–15.4)
GLOBULIN SER CALC-MCNC: 2.6 GM/DL
GLUCOSE SERPL-MCNC: 88 MG/DL (ref 65–99)
HCT VFR BLD AUTO: 36 % (ref 34–46.6)
HDLC SERPL-MCNC: 37 MG/DL (ref 40–60)
HGB BLD-MCNC: 11.5 G/DL (ref 12–15.9)
LDLC SERPL CALC-MCNC: 178 MG/DL (ref 0–100)
LDLC/HDLC SERPL: 4.76 {RATIO}
MCH RBC QN AUTO: 26.9 PG (ref 26.6–33)
MCHC RBC AUTO-ENTMCNC: 31.9 G/DL (ref 31.5–35.7)
MCV RBC AUTO: 84.1 FL (ref 79–97)
PLATELET # BLD AUTO: 336 10*3/MM3 (ref 140–450)
POTASSIUM SERPL-SCNC: 4.6 MMOL/L (ref 3.5–5.2)
PROT SERPL-MCNC: 6.5 G/DL (ref 6–8.5)
RBC # BLD AUTO: 4.28 10*6/MM3 (ref 3.77–5.28)
SODIUM SERPL-SCNC: 139 MMOL/L (ref 136–145)
TRIGL SERPL-MCNC: 169 MG/DL (ref 0–150)
TSH SERPL DL<=0.005 MIU/L-ACNC: 1.84 UIU/ML (ref 0.27–4.2)
VLDLC SERPL CALC-MCNC: 32 MG/DL (ref 5–40)
WBC # BLD AUTO: 4.01 10*3/MM3 (ref 3.4–10.8)

## 2024-01-29 PROCEDURE — 99395 PREV VISIT EST AGE 18-39: CPT | Performed by: NURSE PRACTITIONER

## 2024-01-29 RX ORDER — NORETHINDRONE ACETATE AND ETHINYL ESTRADIOL .03; 1.5 MG/1; MG/1
TABLET ORAL
COMMUNITY

## 2024-01-29 NOTE — PROGRESS NOTES
Chief Complaint  Annual Exam     Subjective:      History of Present Illness {CC  Problem List  Visit  Diagnosis   Encounters  Notes  Medications  Labs  Result Review Imaging  Media :23}     Susan Contreras presents to [facility] for:  annual exam.       She has been seen by ENT  She does have septal deviation and was advised surgery, declines for now.      Allergies: controlled on current regimen.       Susan is here for coordination of medical care, to discuss health maintenance, disease prevention as well as to followup on medical problems.     Patient Care Team:  Adonay London III, NP-C as PCP - General (Internal Medicine)  Kellie Ma MD as Consulting Physician (Gynecology)  Ml Pope MD as Consulting Physician (Otolaryngology)     Activity level is minimal.    Weight trend is     Health and Weight:   Weight trend is   Wt Readings from Last 4 Encounters:   01/29/24 84.1 kg (185 lb 8 oz)   11/15/23 80.7 kg (178 lb)   07/17/23 78.2 kg (172 lb 6.4 oz)   06/05/23 75.8 kg (167 lb)       BMI is >= 30 and <35. (Class 1 Obesity). The following options were offered after discussion;: exercise counseling/recommendations      Health Maintenance Female:    GYN: Anil   Advised routine self-breast exams monthly.  Sexually active:   Pap smears have been: WNL Per patient: last 10/2022:   No family hx of breast cancer  Birthcontrol changed because she had been spotting: spotting 3-4 weeks. Plans on       Colon cancer screen:   Colonoscopy due at 43 yoa unless clinically indicated before.        Vaccines: declined      Last eye exam: routinely: glasses     Advised regular sunscreen.            I have reviewed patient's medical history, any new submitted information provided by patient or medical assistant and updated medical record.      Objective:      Physical Exam  Vitals reviewed.   Constitutional:       Appearance: She is well-developed.   HENT:      " Head: Normocephalic.      Nose: Nose normal.      Mouth/Throat:      Pharynx: Uvula midline.   Eyes:      Conjunctiva/sclera: Conjunctivae normal.      Pupils: Pupils are equal, round, and reactive to light.   Neck:      Thyroid: No thyromegaly.   Cardiovascular:      Rate and Rhythm: Normal rate and regular rhythm.      Pulses: Normal pulses.      Heart sounds: Normal heart sounds, S1 normal and S2 normal. No murmur heard.  Pulmonary:      Effort: Pulmonary effort is normal.      Breath sounds: Normal breath sounds.   Chest:      Chest wall: No deformity.   Abdominal:      General: Bowel sounds are normal.      Palpations: Abdomen is soft.      Tenderness: There is no abdominal tenderness. Negative signs include Tyler's sign.   Genitourinary:     Comments: Defer: GYN   Musculoskeletal:         General: Normal range of motion.      Cervical back: Normal range of motion and neck supple.      Right lower leg: No edema.      Left lower leg: No edema.   Lymphadenopathy:      Cervical: No cervical adenopathy.   Skin:     General: Skin is warm and dry.      Capillary Refill: Capillary refill takes 2 to 3 seconds.   Neurological:      Mental Status: She is alert and oriented to person, place, and time.      Cranial Nerves: No cranial nerve deficit.      Sensory: No sensory deficit.      Coordination: Coordination normal.   Psychiatric:         Speech: Speech normal.         Behavior: Behavior normal. Behavior is cooperative.         Thought Content: Thought content normal.         Judgment: Judgment normal.        Result Review  Data Reviewed:{ Labs  Result Review  Imaging  Med Tab  Media :23}                Vital Signs:   /82 (BP Location: Left arm, Patient Position: Sitting, Cuff Size: Adult)   Pulse 68   Ht 162.6 cm (64\")   Wt 84.1 kg (185 lb 8 oz)   SpO2 99%   BMI 31.84 kg/m²         Requested Prescriptions      No prescriptions requested or ordered in this encounter       Routine medications provided " by this office will also be refilled via pharmacy request.       Current Outpatient Medications:     Azelastine HCl 137 MCG/SPRAY solution, 1 spray into the nostril(s) as directed by provider 2 (Two) Times a Day., Disp: 30 mL, Rfl: 1    fluticasone (FLONASE) 50 MCG/ACT nasal spray, 2 sprays into the nostril(s) as directed by provider Every Night., Disp: 48 mL, Rfl: 1    hydrOXYzine (ATARAX) 25 MG tablet, Take 1 tablet by mouth 3 (Three) Times a Day As Needed for Itching., Disp: 50 tablet, Rfl: 2    Norethindrone Acet-Ethinyl Est 1.5-30 MG-MCG tablet, Take  by mouth., Disp: , Rfl:      Assessment and Plan:      Assessment and Plan {CC Problem List  Visit Diagnosis  ROS  Review (Popup)  Health Maintenance  Quality  BestPractice  Medications  SmartSets  SnapShot Encounters  Media :23}     Problem List Items Addressed This Visit          Allergies and Adverse Reactions    Environmental allergies (Chronic)     Other Visit Diagnoses       Annual physical exam    -  Primary    Relevant Orders    Comprehensive Metabolic Panel    Lipid Panel With LDL / HDL Ratio    CBC (No Diff)    TSH Rfx On Abnormal To Free T4    Screening for hypertension        BP controlled            Follow Up {Instructions Charge Capture  Follow-up Communications :23}     Return in about 1 year (around 1/29/2025) for Annual physical.      Patient was given instructions and counseling regarding her condition or for health maintenance advice. Please see specific information pulled into the AVS if appropriate.    Dragon disclaimer:   Much of this encounter note is an electronic transcription/translation of spoken language to printed text. The electronic translation of spoken language may permit erroneous, or at times, nonsensical words or phrases to be inadvertently transcribed; Although I have reviewed the note for such errors, some may still exist.     Additional Patient Counseling:       Patient Instructions   Diet:    Eat vegetables,  fruits, whole grain, low-fat dairy, poultry, fish, beans, nontropical vegetable oils, and nuts, but avoid red meat (i.e., Mediterranean-style diet, DASH [Dietary Approaches to Stop Hypertension] diet).  Limit sugary drinks and sweets.  Limit saturated and trans fat to 5% to 6% of calories.  Limit sodium intake to 2,400 mg daily (about one teaspoon table salt [kosher/sea salt have less sodium per teaspoon]).    Weight loss / Calorie Counting Apps:    Lose It!   [employer] Pal     Exercise:   Engage in moderate-to-vigorous aerobic activity for at least 40 minutes (on average) three to four times each week.    Wearables:   Activity tracker   Step tracker     Skin Care:   Use sun screen SPF >30 daily  Dermatologist for skin check regularly    Bone Health:   [email or web address]    Mental Health:       CDC recommends Flu vaccines for everyone 6 months and older EVERY season with rare exceptions.

## 2024-01-29 NOTE — PATIENT INSTRUCTIONS
Diet:    Eat vegetables, fruits, whole grain, low-fat dairy, poultry, fish, beans, nontropical vegetable oils, and nuts, but avoid red meat (i.e., Mediterranean-style diet, DASH [Dietary Approaches to Stop Hypertension] diet).  Limit sugary drinks and sweets.  Limit saturated and trans fat to 5% to 6% of calories.  Limit sodium intake to 2,400 mg daily (about one teaspoon table salt [kosher/sea salt have less sodium per teaspoon]).    Weight loss / Calorie Counting Apps:    Lose It!   [employer] Pal     Exercise:   Engage in moderate-to-vigorous aerobic activity for at least 40 minutes (on average) three to four times each week.    Wearables:   Activity tracker   Step tracker     Skin Care:   Use sun screen SPF >30 daily  Dermatologist for skin check regularly    Bone Health:   [email or web address]    Mental Health:       CDC recommends Flu vaccines for everyone 6 months and older EVERY season with rare exceptions.

## 2024-02-01 DIAGNOSIS — D64.9 ANEMIA, UNSPECIFIED TYPE: Primary | ICD-10-CM

## 2024-02-05 ENCOUNTER — TELEPHONE (OUTPATIENT)
Dept: INTERNAL MEDICINE | Facility: CLINIC | Age: 24
End: 2024-02-05
Payer: COMMERCIAL

## 2024-02-05 NOTE — TELEPHONE ENCOUNTER
Patient upset that her issues she has been having haven't been explained to her. She would like a call back to discuss what she found out at her recent OBGYN appointment. She is concerned about her health issues.

## 2024-02-07 ENCOUNTER — OFFICE VISIT (OUTPATIENT)
Dept: INTERNAL MEDICINE | Facility: CLINIC | Age: 24
End: 2024-02-07
Payer: COMMERCIAL

## 2024-02-07 VITALS
DIASTOLIC BLOOD PRESSURE: 80 MMHG | HEIGHT: 64 IN | SYSTOLIC BLOOD PRESSURE: 120 MMHG | BODY MASS INDEX: 31.16 KG/M2 | WEIGHT: 182.5 LBS | OXYGEN SATURATION: 100 % | HEART RATE: 87 BPM

## 2024-02-07 DIAGNOSIS — E78.2 MIXED HYPERLIPIDEMIA: Primary | ICD-10-CM

## 2024-02-07 DIAGNOSIS — D64.89 ANEMIA DUE TO OTHER CAUSE, NOT CLASSIFIED: ICD-10-CM

## 2024-02-07 PROCEDURE — 99213 OFFICE O/P EST LOW 20 MIN: CPT | Performed by: NURSE PRACTITIONER

## 2024-02-07 RX ORDER — MEDROXYPROGESTERONE ACETATE 10 MG/1
TABLET ORAL
COMMUNITY
Start: 2024-02-06

## 2024-02-07 RX ORDER — NORGESTIMATE AND ETHINYL ESTRADIOL 0.25-0.035
KIT ORAL
COMMUNITY
Start: 2024-02-06

## 2024-02-07 NOTE — ASSESSMENT & PLAN NOTE
She had been having heavy bleeding - improved.   Medication being adjusted by GYN.     Will repeat labs in 2-3 weeks.     Have attempted to contact Gyn to get records and no answer for MA.

## 2024-02-07 NOTE — PROGRESS NOTES
Chief Complaint  Results (Recent blood work )     Subjective:      History of Present Illness {CC  Problem List  Visit  Diagnosis   Encounters  Notes  Medications  Labs  Result Review Imaging  Media :23}     Susan Contreras presents to [facility] for:        Anemia:   She had been on birth control and then it was changed due to spotting.  After changed, spotting was worse.  I saw her just before going to GYN.  She states at visit: US was done and normal. They started provera and will then change to another oral.   Bleeding stopped and now sparse.   One pad per day now.     Hyperlipidemia:   Had had burger and turkey with gravy the day before.   She states now she has aggressively changed her diet and wants to continue to modify diet.     Renal function: this has been stable for over 6 years.  Voids well.       Answers submitted by the patient for this visit:  Other (Submitted on 2/6/2024)  Please describe your symptoms.: I want to discuss concerns about my blood work results and my recent visit with the OBGYN.  Have you had these symptoms before?: No  How long have you been having these symptoms?: 1-4 days  Please list any medications you are currently taking for this condition.: Its literally just a discussion  Please describe any probable cause for these symptoms. : JUST A DISCUSSION ABOUT BLOODWORK RESULT CONCERNS.  Primary Reason for Visit (Submitted on 2/6/2024)  What is the primary reason for your visit?: Other      I have reviewed patient's medical history, any new submitted information provided by patient or medical assistant and updated medical record.      Objective:      Physical Exam  Vitals reviewed.   Constitutional:       Appearance: Normal appearance. She is well-developed.   Eyes:      Comments: Conj pink    Neck:      Thyroid: No thyromegaly.   Cardiovascular:      Rate and Rhythm: Normal rate and regular rhythm.      Pulses: Normal pulses.       "Heart sounds: Normal heart sounds. No murmur heard.  Pulmonary:      Effort: Pulmonary effort is normal.      Breath sounds: Normal breath sounds.      Comments: E/U   Abdominal:      Tenderness: There is no abdominal tenderness.   Skin:     Capillary Refill: Capillary refill takes 2 to 3 seconds.   Neurological:      Mental Status: She is alert and oriented to person, place, and time.   Psychiatric:         Behavior: Behavior is cooperative.        Result Review  Data Reviewed:{ Labs  Result Review  Imaging  Med Tab  Media :23}     The following data was reviewed by: Adonay London III, NP-C on 02/07/2024  Common labs          1/29/2024    08:31   Common Labs   Glucose 88    BUN 12    Creatinine 0.48    Sodium 139    Potassium 4.6    Chloride 106    Calcium 9.1    Total Protein 6.5    Albumin 3.9    Total Bilirubin 0.3    Alkaline Phosphatase 43    AST (SGOT) 14    ALT (SGPT) 17    WBC 4.01    Hemoglobin 11.5    Hematocrit 36.0    Platelets 336    Total Cholesterol 247    Triglycerides 169    HDL Cholesterol 37    LDL Cholesterol  178             Vital Signs:   /80 (BP Location: Left arm, Patient Position: Sitting, Cuff Size: Adult)   Pulse 87   Ht 162.6 cm (64\")   Wt 82.8 kg (182 lb 8 oz)   SpO2 100%   BMI 31.33 kg/m²         Requested Prescriptions      No prescriptions requested or ordered in this encounter       Routine medications provided by this office will also be refilled via pharmacy request.       Current Outpatient Medications:     Azelastine HCl 137 MCG/SPRAY solution, 1 spray into the nostril(s) as directed by provider 2 (Two) Times a Day., Disp: 30 mL, Rfl: 1    fluticasone (FLONASE) 50 MCG/ACT nasal spray, 2 sprays into the nostril(s) as directed by provider Every Night., Disp: 48 mL, Rfl: 1    hydrOXYzine (ATARAX) 25 MG tablet, Take 1 tablet by mouth 3 (Three) Times a Day As Needed for Itching., Disp: 50 tablet, Rfl: 2    medroxyPROGESTERone (PROVERA) 10 MG tablet, , " Disp: , Rfl:     Ginny 0.25-35 MG-MCG per tablet, , Disp: , Rfl:      Assessment and Plan:      Assessment and Plan {CC Problem List  Visit Diagnosis  ROS  Review (Popup)  Health Maintenance  Quality  BestPractice  Medications  SmartSets  SnapShot Encounters  Media :23}     Problem List Items Addressed This Visit          Cardiac and Vasculature    Mixed hyperlipidemia - Primary    Current Assessment & Plan     Will work on diet modification and recheck at next visit.             Hematology and Neoplasia    Other specified anemias    Current Assessment & Plan     She had been having heavy bleeding - improved.   Medication being adjusted by GYN.     Will repeat labs in 2-3 weeks.     Have attempted to contact Gyn to get records and no answer for MA.             Follow Up {Instructions Charge Capture  Follow-up Communications :23}     Return if symptoms worsen or fail to improve.      Patient was given instructions and counseling regarding her condition or for health maintenance advice. Please see specific information pulled into the AVS if appropriate.    Dragon disclaimer:   Much of this encounter note is an electronic transcription/translation of spoken language to printed text. The electronic translation of spoken language may permit erroneous, or at times, nonsensical words or phrases to be inadvertently transcribed; Although I have reviewed the note for such errors, some may still exist.     Additional Patient Counseling:       There are no Patient Instructions on file for this visit.

## 2024-02-25 DIAGNOSIS — Z91.09 ENVIRONMENTAL ALLERGIES: Chronic | ICD-10-CM

## 2024-02-26 RX ORDER — AZELASTINE HYDROCHLORIDE 137 UG/1
SPRAY, METERED NASAL
Qty: 30 ML | Refills: 1 | Status: SHIPPED | OUTPATIENT
Start: 2024-02-26

## 2024-02-26 NOTE — TELEPHONE ENCOUNTER
Rx Refill Note  Requested Prescriptions     Pending Prescriptions Disp Refills    Azelastine HCl 137 MCG/SPRAY solution [Pharmacy Med Name: AZELASTINE 0.1% (137 MCG) SPRY] 30 mL 1     Sig: INSTILL 1 SPRAY INTO THE NOSTRIL(S) AS DIRECTED BY PROVIDER 2 (TWO) TIMES A DAY.      Last office visit with prescribing clinician: 2/7/2024  Next office visit with prescribing clinician: 2/3/2025         Carmen Zhang MA  02/26/24, 07:48 EST

## 2024-02-27 ENCOUNTER — LAB (OUTPATIENT)
Facility: HOSPITAL | Age: 24
End: 2024-02-27
Payer: COMMERCIAL

## 2024-02-27 DIAGNOSIS — D64.9 ANEMIA, UNSPECIFIED TYPE: ICD-10-CM

## 2024-02-27 LAB
DEPRECATED RDW RBC AUTO: 40.2 FL (ref 37–54)
ERYTHROCYTE [DISTWIDTH] IN BLOOD BY AUTOMATED COUNT: 12.5 % (ref 12.3–15.4)
FERRITIN SERPL-MCNC: 114 NG/ML (ref 13–150)
HCT VFR BLD AUTO: 36.9 % (ref 34–46.6)
HGB BLD-MCNC: 11.8 G/DL (ref 12–15.9)
MCH RBC QN AUTO: 28 PG (ref 26.6–33)
MCHC RBC AUTO-ENTMCNC: 32 G/DL (ref 31.5–35.7)
MCV RBC AUTO: 87.4 FL (ref 79–97)
PLATELET # BLD AUTO: 272 10*3/MM3 (ref 140–450)
PMV BLD AUTO: 10.8 FL (ref 6–12)
RBC # BLD AUTO: 4.22 10*6/MM3 (ref 3.77–5.28)
VIT B12 BLD-MCNC: 428 PG/ML (ref 211–946)
WBC NRBC COR # BLD AUTO: 4.52 10*3/MM3 (ref 3.4–10.8)

## 2024-02-27 PROCEDURE — 85027 COMPLETE CBC AUTOMATED: CPT

## 2024-02-27 PROCEDURE — 36415 COLL VENOUS BLD VENIPUNCTURE: CPT

## 2024-02-27 PROCEDURE — 82607 VITAMIN B-12: CPT

## 2024-02-27 PROCEDURE — 82728 ASSAY OF FERRITIN: CPT

## 2024-04-15 ENCOUNTER — OFFICE VISIT (OUTPATIENT)
Dept: INTERNAL MEDICINE | Facility: CLINIC | Age: 24
End: 2024-04-15
Payer: COMMERCIAL

## 2024-04-15 VITALS
DIASTOLIC BLOOD PRESSURE: 80 MMHG | OXYGEN SATURATION: 99 % | WEIGHT: 174.5 LBS | HEIGHT: 64 IN | BODY MASS INDEX: 29.79 KG/M2 | SYSTOLIC BLOOD PRESSURE: 120 MMHG | HEART RATE: 66 BPM

## 2024-04-15 DIAGNOSIS — L23.7 POISON IVY: Primary | ICD-10-CM

## 2024-04-15 PROCEDURE — 99213 OFFICE O/P EST LOW 20 MIN: CPT | Performed by: NURSE PRACTITIONER

## 2024-04-15 RX ORDER — SULFAMETHOXAZOLE AND TRIMETHOPRIM 800; 160 MG/1; MG/1
1 TABLET ORAL 2 TIMES DAILY
COMMUNITY

## 2024-04-15 RX ORDER — METHYLPREDNISOLONE 4 MG/1
TABLET ORAL
Qty: 21 EACH | Refills: 0 | Status: SHIPPED | OUTPATIENT
Start: 2024-04-15

## 2024-04-15 NOTE — PROGRESS NOTES
"        Chief Complaint  Rash (Arms and stomach started Friday morning )     Subjective:      History of Present Illness {CC  Problem List  Visit  Diagnosis   Encounters  Notes  Medications  Labs  Result Review Imaging  Media :23}     Susan Contreras presents to [facility] for:      Rash  This is a recurrent problem. The current episode started in the past 7 days. The problem has been worsening since onset. The affected locations include the face, abdomen, left arm and right arm. The rash is characterized by itchiness. It is a new medication and unknown if there was an exposure to a precipitant. She was exposed to a new medication and unknown. Pertinent negatives include no anorexia, congestion, cough, diarrhea, facial edema, fatigue, fever, joint pain, nail changes, rhinorrhea, shortness of breath, sore throat or vomiting.      She was cleaning out fence line so that she would not get hit in face when mowing grass this season.   Then next day started to itch: arms, abd.     Completing bactrim today for cyst.         Answers submitted by the patient for this visit:  Primary Reason for Visit (Submitted on 4/15/2024)  What is the primary reason for your visit?: Rash      I have reviewed patient's medical history, any new submitted information provided by patient or medical assistant and updated medical record.      Objective:      Physical Exam  Cardiovascular:      Rate and Rhythm: Regular rhythm.   Pulmonary:      Breath sounds: Normal breath sounds.   Skin:     Comments: Mac/pap rash on upper arms, abd - 2 spots to face.    Neurological:      Mental Status: She is oriented to person, place, and time.        Result Review  Data Reviewed:{ Labs  Result Review  Imaging  Med Tab  Media :23}                Vital Signs:   /80 (BP Location: Left arm, Patient Position: Sitting, Cuff Size: Adult)   Pulse 66   Ht 162.6 cm (64\")   Wt 79.2 kg (174 lb 8 oz)   SpO2 99%   " BMI 29.95 kg/m²         Requested Prescriptions     Signed Prescriptions Disp Refills    methylPREDNISolone (MEDROL) 4 MG dose pack 21 each 0     Sig: Take as directed on package instructions.       Routine medications provided by this office will also be refilled via pharmacy request.       Current Outpatient Medications:     Azelastine HCl 137 MCG/SPRAY solution, INSTILL 1 SPRAY INTO THE NOSTRIL(S) AS DIRECTED BY PROVIDER 2 (TWO) TIMES A DAY., Disp: 30 mL, Rfl: 1    fluticasone (FLONASE) 50 MCG/ACT nasal spray, 2 sprays into the nostril(s) as directed by provider Every Night., Disp: 48 mL, Rfl: 1    hydrOXYzine (ATARAX) 25 MG tablet, Take 1 tablet by mouth 3 (Three) Times a Day As Needed for Itching., Disp: 50 tablet, Rfl: 2    Ginny 0.25-35 MG-MCG per tablet, , Disp: , Rfl:     methylPREDNISolone (MEDROL) 4 MG dose pack, Take as directed on package instructions., Disp: 21 each, Rfl: 0    sulfamethoxazole-trimethoprim (BACTRIM DS,SEPTRA DS) 800-160 MG per tablet, Take 1 tablet by mouth 2 (Two) Times a Day. Indications: cyst, Disp: , Rfl:      Assessment and Plan:      Assessment and Plan {CC Problem List  Visit Diagnosis  ROS  Review (Popup)  Health Maintenance  Quality  BestPractice  Medications  SmartSets  SnapShot Encounters  Media :23}     Problem List Items Addressed This Visit    None  Visit Diagnoses       Poison ivy    -  Primary    Relevant Medications    methylPREDNISolone (MEDROL) 4 MG dose pack          Also advised over-the-counter calamine lotion as needed.     Follow Up {Instructions Charge Capture  Follow-up Communications :23}     Return if symptoms worsen or fail to improve.      Patient was given instructions and counseling regarding her condition or for health maintenance advice. Please see specific information pulled into the AVS if appropriate.    Dragon disclaimer:   Much of this encounter note is an electronic transcription/translation of spoken language to printed text. The  electronic translation of spoken language may permit erroneous, or at times, nonsensical words or phrases to be inadvertently transcribed; Although I have reviewed the note for such errors, some may still exist.     Additional Patient Counseling:       There are no Patient Instructions on file for this visit.

## 2024-04-25 ENCOUNTER — TELEPHONE (OUTPATIENT)
Dept: INTERNAL MEDICINE | Facility: CLINIC | Age: 24
End: 2024-04-25
Payer: COMMERCIAL

## 2024-04-25 NOTE — TELEPHONE ENCOUNTER
"Caller: Susan Contreras    Relationship: Self [phone] (Home)       What medication are you requesting: STEROID SHOT FOR RASH AS ORAL MEDICATION AND CREAM HAVE NOT DRIED IT UP AT ALL PER PATIETN.    SHE IS GOING OUT OF TOWN NEXT WEEKED AND DOES NOT WANT TO GO OUT OF TOWN FEELING AS MISERABLE AS SHE DOES TODAY.    SHE HAS ALREADY SENT A MY CHART MESSAGE TO DAY SHE SAYS.    PATIENT IS VERY MUCH WANTING TO BE SEEN IN CLINIC FOR STEROID SHOT, SAYS SHE KNOWS HER BODY AND THAT THIS IS THE ONLY THING THAT WILL CLEAR HER RASH, FEELS LIKE SHE IS NOT BEING \"HEARD\".    PLEASE CALL TO DISCUSS  "

## 2024-04-25 NOTE — TELEPHONE ENCOUNTER
I am not in the office this afternoon.    She would need to be evaluated.   To get a steroid injection if needed, she should go to urgent care.     XAVIER    NB: patient also went to UC after getting steroids and had Z-pack   Would need evaluation as could be secondary reaction to abx.

## 2024-05-02 ENCOUNTER — TELEPHONE (OUTPATIENT)
Dept: INTERNAL MEDICINE | Facility: CLINIC | Age: 24
End: 2024-05-02
Payer: COMMERCIAL

## 2024-05-02 DIAGNOSIS — Z91.09 ENVIRONMENTAL ALLERGIES: Chronic | ICD-10-CM

## 2024-05-02 NOTE — TELEPHONE ENCOUNTER
Caller: Susan Contreras    Relationship: Self    Best call back number: [phone]    What medication are you requesting:     fluticasone (FLONASE) 50 MCG/ACT nasal spray       What are your current symptoms:     How long have you been experiencing symptoms:     Have you had these symptoms before:    [] Yes  [] No    Have you been treated for these symptoms before:   [] Yes  [] No    If a prescription is needed, what is your preferred pharmacy and phone number: [facility] [address] [place] [address] ROAD  [phone]    Additional notes:PATIENT IS CALLING IN TO REQUEST A MEDICATION REFILL ON HER     fluticasone (FLONASE) 50 MCG/ACT nasal spray    BUT THIS WAS NOT PRESCRIBED BY DR RUIZ.

## 2024-05-03 RX ORDER — FLUTICASONE PROPIONATE 50 MCG
2 SPRAY, SUSPENSION (ML) NASAL NIGHTLY
Qty: 48 ML | Refills: 2 | Status: SHIPPED | OUTPATIENT
Start: 2024-05-03

## 2024-05-03 NOTE — TELEPHONE ENCOUNTER
Rx Refill Note  Requested Prescriptions     Pending Prescriptions Disp Refills    fluticasone (FLONASE) 50 MCG/ACT nasal spray 48 mL 1     Si sprays into the nostril(s) as directed by provider Every Night.      Last office visit with prescribing clinician: 4/15/2024  Next office visit with prescribing clinician: 2/3/2025

## 2024-05-24 ENCOUNTER — OFFICE VISIT (OUTPATIENT)
Dept: INTERNAL MEDICINE | Facility: CLINIC | Age: 24
End: 2024-05-24
Payer: COMMERCIAL

## 2024-05-24 VITALS
HEART RATE: 80 BPM | WEIGHT: 168 LBS | SYSTOLIC BLOOD PRESSURE: 100 MMHG | TEMPERATURE: 97.9 F | HEIGHT: 64 IN | DIASTOLIC BLOOD PRESSURE: 80 MMHG | OXYGEN SATURATION: 100 % | BODY MASS INDEX: 28.68 KG/M2

## 2024-05-24 DIAGNOSIS — J01.10 ACUTE NON-RECURRENT FRONTAL SINUSITIS: Primary | ICD-10-CM

## 2024-05-24 DIAGNOSIS — L70.0 ACNE VULGARIS: ICD-10-CM

## 2024-05-24 PROCEDURE — 99213 OFFICE O/P EST LOW 20 MIN: CPT | Performed by: NURSE PRACTITIONER

## 2024-05-24 RX ORDER — DOXYCYCLINE HYCLATE 100 MG/1
100 CAPSULE ORAL 2 TIMES DAILY
Qty: 20 CAPSULE | Refills: 0 | Status: SHIPPED | OUTPATIENT
Start: 2024-05-24 | End: 2024-06-03

## 2024-05-24 NOTE — ASSESSMENT & PLAN NOTE
Recommended mild facial cleanser such as cerave or Neutrogena acne wash twice daily  Apply benzoyl peroxide topically OTC to lesions BID PRN  Referral placed to derm

## 2024-05-24 NOTE — PROGRESS NOTES
"Chief Complaint  Nasal Congestion and Acne    Subjective        Susan Contreras presents to [facility]  History of Present Illness  This is a 22 y/o female presenting to office for reports concern of clearing her throat with mucus and some complaints of acne.     Reports she has been having post nasal drainage for the past month; reports she does have issues with her seasonal allergies. Reports she was tx with sinusitis recently at the [facility]. Reports she was not treated with an antibiotic. Reports she was given stahist. Denies fever. Reports she is still having some productive mucus which was darker in color-- greenish brownish.     Reports she has been struggling with acne as well; reports recent change in birth control pills; reports she has been using a different face wash but switching back to Neutrogena acne wash. Reports she would like to see dermatologist.     Objective   Vital Signs:  /80 (BP Location: Left arm, Patient Position: Sitting, Cuff Size: Adult)   Pulse 80   Temp 97.9 °F (36.6 °C) (Oral)   Ht 162.6 cm (64\")   Wt 76.2 kg (168 lb)   SpO2 100%   BMI 28.84 kg/m²   Estimated body mass index is 28.84 kg/m² as calculated from the following:    Height as of this encounter: 162.6 cm (64\").    Weight as of this encounter: 76.2 kg (168 lb).               Physical Exam  Constitutional:       Appearance: Normal appearance.   HENT:      Head: Normocephalic and atraumatic.      Right Ear: External ear normal.      Left Ear: External ear normal.      Nose: Congestion present.      Mouth/Throat:      Mouth: Mucous membranes are moist.      Pharynx: Oropharynx is clear.   Eyes:      Conjunctiva/sclera: Conjunctivae normal.      Pupils: Pupils are equal, round, and reactive to light.   Cardiovascular:      Rate and Rhythm: Normal rate and regular rhythm.      Pulses: Normal pulses.      Heart sounds: Normal heart sounds. No murmur heard.     No gallop.   Pulmonary: "      Effort: Pulmonary effort is normal. No respiratory distress.      Breath sounds: Normal breath sounds. No stridor. No wheezing, rhonchi or rales.   Musculoskeletal:      Cervical back: Normal range of motion and neck supple.   Skin:     General: Skin is warm and dry.      Capillary Refill: Capillary refill takes less than 2 seconds.      Findings: Acne present.   Neurological:      Mental Status: She is alert and oriented to person, place, and time. Mental status is at baseline.   Psychiatric:         Mood and Affect: Mood normal.         Thought Content: Thought content normal.         Judgment: Judgment normal.        Result Review :    The following data was reviewed by: NEVA Farias on 2024:  Common labs          2024    08:31 2024    09:17   Common Labs   Glucose 88     BUN 12     Creatinine 0.48     Sodium 139     Potassium 4.6     Chloride 106     Calcium 9.1     Total Protein 6.5     Albumin 3.9     Total Bilirubin 0.3     Alkaline Phosphatase 43     AST (SGOT) 14     ALT (SGPT) 17     WBC 4.01  4.52    Hemoglobin 11.5  11.8    Hematocrit 36.0  36.9    Platelets 336  272    Total Cholesterol 247     Triglycerides 169     HDL Cholesterol 37     LDL Cholesterol  178       Tobacco Use: Low Risk  (2024)    Patient History     Smoking Tobacco Use: Never     Smokeless Tobacco Use: Never     Passive Exposure: Never     Social History     Substance and Sexual Activity   Alcohol Use Never     Family History   Problem Relation Age of Onset    Arthritis Mother     Depression Mother     Hyperlipidemia Mother     Hypertension Mother     Alcohol abuse Father         Estranged father    Depression Maternal Grandmother     Hyperlipidemia Maternal Grandmother     Other Maternal Grandmother         Parkinsins    Diabetes Maternal Grandfather     Heart disease Maternal Grandfather          of Congestive Heart Failure    Cancer Paternal Grandfather          of Lung Cancer                   Assessment and Plan     Diagnoses and all orders for this visit:    1. Acute non-recurrent frontal sinusitis (Primary)  Assessment & Plan:  Doxycycline as prescribed  Continue with antihistamine, flonase, astelin  Nasal rinses PRN  Tylenol 650mg Q6H PRN      Orders:  -     doxycycline (VIBRAMYCIN) 100 MG capsule; Take 1 capsule by mouth 2 (Two) Times a Day for 10 days.  Dispense: 20 capsule; Refill: 0    2. Acne vulgaris  Assessment & Plan:  Recommended mild facial cleanser such as cerave or Neutrogena acne wash twice daily  Apply benzoyl peroxide topically OTC to lesions BID PRN  Referral placed to derm     Orders:  -     Ambulatory Referral to Dermatology             Follow Up     Return for Next scheduled follow up 2/3/25.  Patient was given instructions and counseling regarding her condition or for health maintenance advice. Please see specific information pulled into the AVS if appropriate.

## 2024-05-24 NOTE — ASSESSMENT & PLAN NOTE
Doxycycline as prescribed  Continue with antihistamine, flonase, astelin  Nasal rinses PRN  Tylenol 650mg Q6H PRN

## 2024-06-05 ENCOUNTER — TELEPHONE (OUTPATIENT)
Dept: INTERNAL MEDICINE | Facility: CLINIC | Age: 24
End: 2024-06-05
Payer: COMMERCIAL

## 2024-06-05 NOTE — TELEPHONE ENCOUNTER
Patient had scheduled an appointment to be evaluated for a head injury that she had 3 weeks. Patient states she felt better but now her head is sore and so is her arm    Lucila recommended that I call patient to advise her to go to the ER so a stat CT scan can be done for head trauma     Patient agreed to go to [facility] ER on [address]. Canceled appointment with Lucila

## 2024-06-07 ENCOUNTER — TELEPHONE (OUTPATIENT)
Dept: INTERNAL MEDICINE | Facility: CLINIC | Age: 24
End: 2024-06-07
Payer: COMMERCIAL

## 2024-06-07 NOTE — TELEPHONE ENCOUNTER
Patient was mowing her lawn and bumped her head on a tree limb. Next day patient noticed the top of her head was sore and sensitive. Went to  and was give IBU and was sent home. Pt noticed 2 weeks later the soreness on her head again and pain down left side, no longer having pain.     Patient called the office and had an appointment with Lucila for her head injury.She was called and advised to go to ER to be evaluated and seen. Didn't go to ER because she didn't want a huge bill to pay for. Denied ER twice when was told to go.     Made pt an appointment with provider for next week. Advised if she gets any dizness sob headache to go be seen and evaluated.

## 2024-06-10 ENCOUNTER — OFFICE VISIT (OUTPATIENT)
Dept: INTERNAL MEDICINE | Facility: CLINIC | Age: 24
End: 2024-06-10
Payer: COMMERCIAL

## 2024-06-10 VITALS
BODY MASS INDEX: 29.02 KG/M2 | SYSTOLIC BLOOD PRESSURE: 124 MMHG | HEART RATE: 68 BPM | WEIGHT: 170 LBS | OXYGEN SATURATION: 99 % | DIASTOLIC BLOOD PRESSURE: 78 MMHG | HEIGHT: 64 IN

## 2024-06-10 DIAGNOSIS — S00.03XA CONTUSION OF SCALP, INITIAL ENCOUNTER: Primary | ICD-10-CM

## 2024-06-10 PROCEDURE — 99213 OFFICE O/P EST LOW 20 MIN: CPT | Performed by: NURSE PRACTITIONER

## 2024-06-10 RX ORDER — ORPHENADRINE CITRATE 100 MG/1
100 TABLET, EXTENDED RELEASE ORAL 2 TIMES DAILY PRN
Qty: 30 TABLET | Refills: 0 | Status: SHIPPED | OUTPATIENT
Start: 2024-06-10

## 2024-06-10 NOTE — PROGRESS NOTES
Chief Complaint  Head Injury (3 weeks ago )     Subjective:      History of Present Illness {CC  Problem List  Visit  Diagnosis   Encounters  Notes  Medications  Labs  Result Review Imaging  Media :23}     Susan Contreras presents to [facility] for:      UC: 5/18/24: reported she was using a push mower when she raised up and bumped her head on a tree branch. She did not loose consciousness.    Continued to mow.     No change in vision     Did not have any wound.   She is here with her mother today: stating area is sensitive.   No weakness.         Answers submitted by the patient for this visit:  Other (Submitted on 6/9/2024)  Please describe your symptoms.: Sesitive spot on head, left side of head feels strained  Have you had these symptoms before?: No  How long have you been having these symptoms?: 5-7 days  Please list any medications you are currently taking for this condition.: Ibuprofen  Please describe any probable cause for these symptoms. : Hit my head a few weeks ago on a tree limb while push mowing a yard. Symptoms cleared up a few days later but came back a week ago.  Primary Reason for Visit (Submitted on 6/9/2024)  What is the primary reason for your visit?: Other      I have reviewed patient's medical history, any new submitted information provided by patient or medical assistant and updated medical record.      Objective:      Physical Exam  Constitutional:       Appearance: Normal appearance.   HENT:      Head:     Eyes:      Pupils: Pupils are equal, round, and reactive to light.   Cardiovascular:      Rate and Rhythm: Normal rate and regular rhythm.   Skin:     Findings: No lesion.   Neurological:      General: No focal deficit present.      Mental Status: She is alert and oriented to person, place, and time.      Cranial Nerves: No cranial nerve deficit.      Sensory: No sensory deficit.      Motor: No weakness or pronator drift.       "Coordination: Coordination normal.      Gait: Gait normal.        Result Review  Data Reviewed:{ Labs  Result Review  Imaging  Med Tab  Media :23}                Vital Signs:   /78 (BP Location: Left arm, Patient Position: Sitting, Cuff Size: Adult)   Pulse 68   Ht 162.6 cm (64\")   Wt 77.1 kg (170 lb)   SpO2 99%   BMI 29.18 kg/m²   Estimated body mass index is 29.18 kg/m² as calculated from the following:    Height as of this encounter: 162.6 cm (64\").    Weight as of this encounter: 77.1 kg (170 lb).        Requested Prescriptions     Signed Prescriptions Disp Refills    orphenadrine (NORFLEX) 100 MG 12 hr tablet 30 tablet 0     Sig: Take 1 tablet by mouth 2 (Two) Times a Day As Needed for Muscle Spasms or Mild Pain.       Routine medications provided by this office will also be refilled via pharmacy request.       Current Outpatient Medications:     Azelastine HCl 137 MCG/SPRAY solution, INSTILL 1 SPRAY INTO THE NOSTRIL(S) AS DIRECTED BY PROVIDER 2 (TWO) TIMES A DAY., Disp: 30 mL, Rfl: 1    fluticasone (FLONASE) 50 MCG/ACT nasal spray, 2 sprays into the nostril(s) as directed by provider Every Night., Disp: 48 mL, Rfl: 2    hydrOXYzine (ATARAX) 25 MG tablet, Take 1 tablet by mouth 3 (Three) Times a Day As Needed for Itching., Disp: 50 tablet, Rfl: 2    ibuprofen (ADVIL,MOTRIN) 800 MG tablet, Take 1 tablet by mouth Every 6 (Six) Hours As Needed for Mild Pain., Disp: 15 tablet, Rfl: 0    Ginny 0.25-35 MG-MCG per tablet, , Disp: , Rfl:     orphenadrine (NORFLEX) 100 MG 12 hr tablet, Take 1 tablet by mouth 2 (Two) Times a Day As Needed for Muscle Spasms or Mild Pain., Disp: 30 tablet, Rfl: 0    triamcinolone (KENALOG) 0.1 % cream, APPLY 1 APPLICATION EXTERNALLY EVERY 12 HRS FOR 7 DAYS (Patient not taking: Reported on 6/10/2024), Disp: , Rfl:      Assessment and Plan:      Assessment and Plan {CC Problem List  Visit Diagnosis  ROS  Review (Popup)  Health Maintenance  Quality  BestPractice  " Medications  [facility]  SnapShot Encounters  Media :23}     Diagnoses and all orders for this visit:    1. Contusion of scalp, initial encounter (Primary)  -     orphenadrine (NORFLEX) 100 MG 12 hr tablet; Take 1 tablet by mouth 2 (Two) Times a Day As Needed for Muscle Spasms or Mild Pain.  Dispense: 30 tablet; Refill: 0      She has no neuro deficiency.   Symptoms not worsening.   IBU has been helpful - will give her norflex for spasm.          New Medications Ordered This Visit   Medications    orphenadrine (NORFLEX) 100 MG 12 hr tablet     Sig: Take 1 tablet by mouth 2 (Two) Times a Day As Needed for Muscle Spasms or Mild Pain.     Dispense:  30 tablet     Refill:  0     I discussed medication use, dosing and possible side effects.   Patient was given opportunity to ask any questions.    Agreeable with plan.     Follow Up {Instructions Charge Capture  Follow-up Communications :23}     Return if symptoms worsen or fail to improve.      Patient was given instructions and counseling regarding her condition or for health maintenance advice. Please see specific information pulled into the AVS if appropriate.    Dragon disclaimer:   Much of this encounter note is an electronic transcription/translation of spoken language to printed text. The electronic translation of spoken language may permit erroneous, or at times, nonsensical words or phrases to be inadvertently transcribed; Although I have reviewed the note for such errors, some may still exist.     Additional Patient Counseling:       There are no Patient Instructions on file for this visit.

## 2024-07-30 DIAGNOSIS — Z91.09 ENVIRONMENTAL ALLERGIES: ICD-10-CM

## 2024-07-30 RX ORDER — HYDROXYZINE HYDROCHLORIDE 25 MG/1
TABLET, FILM COATED ORAL
Qty: 50 TABLET | Refills: 2 | Status: SHIPPED | OUTPATIENT
Start: 2024-07-30

## 2024-07-30 NOTE — TELEPHONE ENCOUNTER
Rx Refill Note  Requested Prescriptions     Pending Prescriptions Disp Refills    hydrOXYzine (ATARAX) 25 MG tablet [Pharmacy Med Name: HYDROXYZINE HCL 25 MG TABLET] 50 tablet 2     Sig: TAKE 1 TABLET BY MOUTH THREE TIMES A DAY AS NEEDED FOR ITCHING      Last office visit with prescribing clinician: 6/10/2024  Next office visit with prescribing clinician: 2/3/2025         Carmen Zhang MA  07/30/24, 11:27 EDT

## 2024-11-04 ENCOUNTER — OFFICE VISIT (OUTPATIENT)
Dept: INTERNAL MEDICINE | Facility: CLINIC | Age: 24
End: 2024-11-04
Payer: COMMERCIAL

## 2024-11-04 VITALS
OXYGEN SATURATION: 97 % | SYSTOLIC BLOOD PRESSURE: 116 MMHG | HEART RATE: 68 BPM | WEIGHT: 178 LBS | DIASTOLIC BLOOD PRESSURE: 82 MMHG | TEMPERATURE: 97.1 F | BODY MASS INDEX: 30.39 KG/M2 | HEIGHT: 64 IN

## 2024-11-04 DIAGNOSIS — R22.1 MASS OF LEFT SIDE OF NECK: Primary | ICD-10-CM

## 2024-11-04 DIAGNOSIS — R22.32 MASS OF LEFT AXILLA: ICD-10-CM

## 2024-11-04 DIAGNOSIS — Z91.09 ENVIRONMENTAL ALLERGIES: Chronic | ICD-10-CM

## 2024-11-04 LAB
BASOPHILS # BLD AUTO: 0.04 10*3/MM3 (ref 0–0.2)
BASOPHILS NFR BLD AUTO: 0.6 % (ref 0–1.5)
EOSINOPHIL # BLD AUTO: 0.06 10*3/MM3 (ref 0–0.4)
EOSINOPHIL NFR BLD AUTO: 0.8 % (ref 0.3–6.2)
ERYTHROCYTE [DISTWIDTH] IN BLOOD BY AUTOMATED COUNT: 12.2 % (ref 12.3–15.4)
HCT VFR BLD AUTO: 37.9 % (ref 34–46.6)
HGB BLD-MCNC: 12.3 G/DL (ref 12–15.9)
IMM GRANULOCYTES # BLD AUTO: 0.03 10*3/MM3 (ref 0–0.05)
IMM GRANULOCYTES NFR BLD AUTO: 0.4 % (ref 0–0.5)
LYMPHOCYTES # BLD AUTO: 1.7 10*3/MM3 (ref 0.7–3.1)
LYMPHOCYTES NFR BLD AUTO: 23.4 % (ref 19.6–45.3)
MCH RBC QN AUTO: 28.8 PG (ref 26.6–33)
MCHC RBC AUTO-ENTMCNC: 32.5 G/DL (ref 31.5–35.7)
MCV RBC AUTO: 88.8 FL (ref 79–97)
MONOCYTES # BLD AUTO: 0.39 10*3/MM3 (ref 0.1–0.9)
MONOCYTES NFR BLD AUTO: 5.4 % (ref 5–12)
NEUTROPHILS # BLD AUTO: 5.03 10*3/MM3 (ref 1.7–7)
NEUTROPHILS NFR BLD AUTO: 69.4 % (ref 42.7–76)
NRBC BLD AUTO-RTO: 0 /100 WBC (ref 0–0.2)
PLATELET # BLD AUTO: 396 10*3/MM3 (ref 140–450)
RBC # BLD AUTO: 4.27 10*6/MM3 (ref 3.77–5.28)
WBC # BLD AUTO: 7.25 10*3/MM3 (ref 3.4–10.8)

## 2024-11-04 PROCEDURE — 99214 OFFICE O/P EST MOD 30 MIN: CPT

## 2024-11-04 RX ORDER — FLUOCINONIDE TOPICAL SOLUTION USP, 0.05% 0.5 MG/ML
SOLUTION TOPICAL
COMMUNITY
Start: 2024-07-08

## 2024-11-04 RX ORDER — SPIRONOLACTONE 100 MG/1
1 TABLET, FILM COATED ORAL DAILY
COMMUNITY
Start: 2024-10-21

## 2024-11-04 NOTE — PROGRESS NOTES
Chief Complaint  Mass (Lt lymph node x2weeks ago, still swollen some now lump on lt arm pit, no pain or issue)     Subjective:      History of Present Illness {CC  Problem List  Visit  Diagnosis   Encounters  Notes  Medications  Labs  Result Review Imaging  Media :23}     Susan Contreras presents to [facility] for:      History of Present Illness     Susan Contreras is a patient of Adonay London III, NP-C and presents today with complaints of mass in neck and left armpit for past two weeks.       The patient presents for evaluation of a left-sided neck mass and mass of left axilla.     She first noticed the mass in her left neck a few weeks ago.  A lump is palpable when she bends her neck down. The neck mass occasionally causes discomfort, but it has since reduced in size. She reports no ear, jaw, tooth or throat pain. Denies difficulty swallowing. She denies fever, chills, night sweats or recent illness.     Last Friday, she discovered a mass in her left armpit while shaving. This is her first occurrence of an armpit mass. These masses have not been previously imaged. She denies pain or tenderness.     She has experienced a similar neck mass in the past. She reports no recent illness or fever. Her menstrual period is due next week. She had a gynecological visit last week.    She has a history of frequent strep infections during her childhood, but none recently. Her last dental visit was approximately 5 months ago, during which no issues were identified.    She suffers from severe allergies and takes Zyrtec as needed. She also takes hydroxyzine daily for an itchy throat, as prescribed by her pcp. Additionally, she uses Flonase and azelastine nasal sprays.         I have reviewed patient's medical history, any new submitted information provided by patient or medical assistant and updated medical record.      Objective:      Physical Exam  Vitals  reviewed.   Constitutional:       General: She is not in acute distress.     Appearance: Normal appearance. She is not ill-appearing, toxic-appearing or diaphoretic.   HENT:      Head: Normocephalic.   Neck:        Comments: Small dime size nodule felt with head lowered to chin   Cardiovascular:      Rate and Rhythm: Normal rate and regular rhythm.      Pulses: Normal pulses.      Heart sounds: Normal heart sounds.   Pulmonary:      Effort: Pulmonary effort is normal.      Breath sounds: Normal breath sounds.   Abdominal:      General: Abdomen is flat. Bowel sounds are normal.      Palpations: Abdomen is soft.   Lymphadenopathy:      Head:      Right side of head: No submental, submandibular, tonsillar, preauricular or posterior auricular adenopathy.      Left side of head: No submental, submandibular, tonsillar, preauricular or posterior auricular adenopathy.      Cervical: Cervical adenopathy present.      Right cervical: No superficial, deep or posterior cervical adenopathy.     Left cervical: Superficial cervical adenopathy present. No deep or posterior cervical adenopathy.      Upper Body:      Right upper body: No supraclavicular, axillary, pectoral or epitrochlear adenopathy.      Left upper body: Axillary adenopathy present. No supraclavicular, pectoral or epitrochlear adenopathy.      Lower Body: No right inguinal adenopathy. No left inguinal adenopathy.      Comments: Small dime size nodule felt with head lowered to chin.   Small pea sized firm nodule felt on palpation   Skin:     General: Skin is warm and dry.      Capillary Refill: Capillary refill takes less than 2 seconds.   Neurological:      General: No focal deficit present.      Mental Status: She is alert.   Psychiatric:         Mood and Affect: Mood normal.         Behavior: Behavior normal.        Result Review  Data Reviewed:{ Labs  Result Review  Imaging  Med Tab  Media :23}     Results         The following data was reviewed by: Marilyn  "JAMISON CastelanMAXIMILIAN on 11/04/2024  CMP          1/29/2024    08:31   CMP   Glucose 88    BUN 12    Creatinine 0.48    Sodium 139    Potassium 4.6    Chloride 106    Calcium 9.1    Total Protein 6.5    Albumin 3.9    Globulin 2.6    Total Bilirubin 0.3    Alkaline Phosphatase 43    AST (SGOT) 14    ALT (SGPT) 17    BUN/Creatinine Ratio 25.0      CBC          1/29/2024    08:31 2/27/2024    09:17   CBC   WBC 4.01  4.52    RBC 4.28  4.22    Hemoglobin 11.5  11.8    Hematocrit 36.0  36.9    MCV 84.1  87.4    MCH 26.9  28.0    MCHC 31.9  32.0    RDW 12.1  12.5    Platelets 336  272      CBC w/diff          1/29/2024    08:31 2/27/2024    09:17   CBC w/Diff   WBC 4.01  4.52    RBC 4.28  4.22    Hemoglobin 11.5  11.8    Hematocrit 36.0  36.9    MCV 84.1  87.4    MCH 26.9  28.0    MCHC 31.9  32.0    RDW 12.1  12.5    Platelets 336  272      Lipid Panel          1/29/2024    08:31   Lipid Panel   Total Cholesterol 247    Triglycerides 169    HDL Cholesterol 37    VLDL Cholesterol 32    LDL Cholesterol  178    LDL/HDL Ratio 4.76      TSH          1/29/2024    08:31   TSH   TSH 1.840             Vital Signs:   /82 (BP Location: Left arm, Patient Position: Sitting, Cuff Size: Adult)   Pulse 68   Temp 97.1 °F (36.2 °C) (Temporal)   Ht 162.6 cm (64.02\")   Wt 80.7 kg (178 lb)   SpO2 97%   BMI 30.54 kg/m²                 Requested Prescriptions      No prescriptions requested or ordered in this encounter       Routine medications provided by this office will also be refilled via pharmacy request.       Current Outpatient Medications:     Azelastine HCl 137 MCG/SPRAY solution, INSTILL 1 SPRAY INTO THE NOSTRIL(S) AS DIRECTED BY PROVIDER 2 (TWO) TIMES A DAY., Disp: 30 mL, Rfl: 1    fluocinonide (LIDEX) 0.05 % external solution, APPLY SEVERAL DROPS TO SCALP DAILY FOR UP TO TWO WEEKS THEN AS NEEDED, Disp: , Rfl:     fluticasone (FLONASE) 50 MCG/ACT nasal spray, 2 sprays into the nostril(s) as directed by provider Every Night., " Disp: 48 mL, Rfl: 2    hydrOXYzine (ATARAX) 25 MG tablet, TAKE 1 TABLET BY MOUTH THREE TIMES A DAY AS NEEDED FOR ITCHING, Disp: 50 tablet, Rfl: 2    Ginny 0.25-35 MG-MCG per tablet, , Disp: , Rfl:     orphenadrine (NORFLEX) 100 MG 12 hr tablet, Take 1 tablet by mouth 2 (Two) Times a Day As Needed for Muscle Spasms or Mild Pain., Disp: 30 tablet, Rfl: 0    spironolactone (ALDACTONE) 100 MG tablet, Take 1 tablet by mouth Daily., Disp: , Rfl:      Assessment and Plan:      Assessment and Plan {CC Problem List  Visit Diagnosis  ROS  Review (Popup)  Health Maintenance  Quality  BestPractice  Medications  SmartSets  SnapShot Encounters  Media :23}     Problem List Items Addressed This Visit    None  Visit Diagnoses       Mass of left side of neck    -  Primary    Relevant Orders    US Head Neck Soft Tissue    CBC & Differential    Mass of left axilla        Relevant Orders    US Axilla Left    CBC & Differential                 1. Left-sided neck mass.  A pea-sized mass is palpable in the left axilla. Neither the neck nor the axillary mass exhibits tenderness upon palpation. The thyroid is not a cause for concern at this time. Ultrasounds of both the neck and axillary masses will be ordered. A basic CBC will be conducted to rule out any underlying infection. She will be contacted with the results. Should the masses persist, a CT scan or other imaging modalities may be considered.    2. Left axillary mass.  A pea-sized mass is palpable in the left axilla. Neither the neck nor the axillary mass exhibits tenderness upon palpation. The thyroid is not a cause for concern at this time. Ultrasounds of both the neck and axillary masses will be ordered. A basic CBC will be conducted to rule out any underlying infection. She will be contacted with the results. Should the masses persist, a CT scan or other imaging modalities may be considered.    3. Allergies.  She reports taking Zyrtec occasionally and hydroxyzine daily  for an itchy throat. She also uses Flonase and azelastine nasal sprays. She is advised to continue her current allergy management regimen.         Follow Up {Instructions Charge Capture  Follow-up Communications :23}     Return if symptoms worsen or fail to improve.      Patient was given instructions and counseling regarding her condition or for health maintenance advice. Please see specific information pulled into the AVS if appropriate.        Dragon disclaimer:   Much of this encounter note is an electronic transcription/translation of spoken language to printed text. The electronic translation of spoken language may permit erroneous, or at times, nonsensical words or phrases to be inadvertently transcribed; Although I have reviewed the note for such errors, some may still exist.     Additional Patient Counseling:       There are no Patient Instructions on file for this visit.    Patient or patient representative verbalized consent for the use of Ambient Listening during the visit with  NEVA Acuña for chart documentation. 11/4/2024  13:11 EST

## 2024-11-04 NOTE — PATIENT INSTRUCTIONS
Lymphadenopathy    Lymphadenopathy means that your lymph glands are swollen or larger than normal. Lymph glands, also called lymph nodes, are collections of tissue that filter excess fluid, bacteria, viruses, and waste from your bloodstream. They are part of your body's disease-fighting system (immune system), which protects your body from germs.  There may be different causes of lymphadenopathy, depending on where it is in your body. Some types go away on their own. Lymphadenopathy can occur anywhere that you have lymph glands, including these areas:  Neck (cervical lymphadenopathy).  Chest (mediastinal lymphadenopathy).  Lungs (hilar lymphadenopathy).  Underarms (axillary lymphadenopathy).  Groin (inguinal lymphadenopathy).  When your immune system responds to germs, infection-fighting cells and fluid build up in your lymph glands. This causes some swelling and enlargement. If the lymph nodes do not go back to normal size after you have an infection or disease, your health care provider may do tests. These tests help to monitor your condition and find the reason why the glands are still swollen and enlarged.  Follow these instructions at home:    Get plenty of rest.  Your health care provider may recommend over-the-counter medicines for pain. Take over-the-counter and prescription medicines only as told by your health care provider.  If directed, apply heat to swollen lymph glands as often as told by your health care provider. Use the heat source that your health care provider recommends, such as a moist heat pack or a heating pad.  Place a towel between your skin and the heat source.  Leave the heat on for 20-30 minutes.  Remove the heat if your skin turns bright red. This is especially important if you are unable to feel pain, heat, or cold. You may have a greater risk of getting burned.  Check your affected lymph glands every day for changes. Check other lymph gland areas as told by your health care provider.  Check for changes such as:  More swelling.  Sudden increase in size.  Redness or pain.  Hardness.  Keep all follow-up visits. This is important.  Contact a health care provider if you have:  Lymph glands that:  Are still swollen after 2 weeks.  Have suddenly gotten bigger or the swelling spreads.  Are red, painful, or hard.  Fluid leaking from the skin near an enlarged lymph gland.  Problems with breathing.  A fever, chills, or night sweats.  Fatigue.  A sore throat.  Pain in your abdomen.  Weight loss.  Get help right away if you have:  Severe pain.  Chest pain.  Shortness of breath.  These symptoms may represent a serious problem that is an emergency. Do not wait to see if the symptoms will go away. Get medical help right away. Call your local emergency services (911 in the [place] Do not drive yourself to the hospital.  Summary  Lymphadenopathy means that your lymph glands are swollen or larger than normal.  Lymph glands, also called lymph nodes, are collections of tissue that filter excess fluid, bacteria, viruses, and waste from the bloodstream. They are part of your body's disease-fighting system (immune system).  Lymphadenopathy can occur anywhere that you have lymph glands.  If the lymph nodes do not go back to normal size after you have an infection or disease, your health care provider may do tests to monitor your condition and find the reason why the glands are still swollen and enlarged.  Check your affected lymph glands every day for changes. Check other lymph gland areas as told by your health care provider.  This information is not intended to replace advice given to you by your health care provider. Make sure you discuss any questions you have with your health care provider.  Document Revised: 10/13/2021 Document Reviewed: 10/13/2021  [employer] Patient Education © 2024 [employer]

## 2024-11-06 ENCOUNTER — TELEPHONE (OUTPATIENT)
Dept: INTERNAL MEDICINE | Facility: CLINIC | Age: 24
End: 2024-11-06
Payer: COMMERCIAL

## 2024-11-06 NOTE — PROGRESS NOTES
Ms Diane,   Your CBC is normal; no evidence of anemia or infection. Our scheduling department will be in touch with you to schedule your upcoming Ultrasounds.   Thank you for allowing us to care for you,  NEVA Acuña

## 2024-11-06 NOTE — TELEPHONE ENCOUNTER
Caller: Susan Contreras    Relationship: Self    Best call back number:     Susan Contreras (Self) [phone] (Home)       What was the call regarding: NO ONE HAS CALLED TO SCHEDULE THE ULTRASOUND APPTS YET     PLEASE ADVISE     Is it okay if the provider responds through MyChart: CALL     ORDERS PLACED BY NEVA TATE

## 2024-11-12 ENCOUNTER — TELEPHONE (OUTPATIENT)
Dept: INTERNAL MEDICINE | Facility: CLINIC | Age: 24
End: 2024-11-12
Payer: COMMERCIAL

## 2024-11-12 NOTE — TELEPHONE ENCOUNTER
HUB RELAY.     If patient calls back please let patient know we can't switch providers in the office. Office policy

## 2024-11-12 NOTE — TELEPHONE ENCOUNTER
"PATIENT CALLED REQUESTING TO SEE RICHARD TATE HAS A NEW PCP. SHE IS CURRENTLY SEEING JIMENEZ RUIZ.    SHE STATES SHE \"CLICKS\" BETTER WITH RICHARD.  SHE REALLY WOULD LIKE TO STAY AT THIS OFFICE.     PLEASE CALL AND ADVISE IF SHE WILL  BE ABLE TO SEE RICHARD TATE AS HER NEW PCP    CALL BACK NUMBER [phone]  "

## 2024-11-13 NOTE — TELEPHONE ENCOUNTER
"Attempt x 2.     Relay     \"  HUB RELAY.      If patient calls back please let patient know we can't switch providers in the office. Office policy   \"      "

## 2024-11-22 ENCOUNTER — HOSPITAL ENCOUNTER (OUTPATIENT)
Dept: ULTRASOUND IMAGING | Facility: HOSPITAL | Age: 24
Discharge: HOME | End: 2024-11-22
Payer: COMMERCIAL

## 2024-11-22 DIAGNOSIS — R22.1 MASS OF LEFT SIDE OF NECK: ICD-10-CM

## 2024-11-22 DIAGNOSIS — R22.32 MASS OF LEFT AXILLA: ICD-10-CM

## 2024-11-22 PROCEDURE — 76882 US LMTD JT/FCL EVL NVASC XTR: CPT

## 2024-11-22 PROCEDURE — 76536 US EXAM OF HEAD AND NECK: CPT

## 2024-12-02 NOTE — PROGRESS NOTES
Attempted to call pt regarding results, Left VM - needing to speak with her when she returns call please and thank you.   Thank you  NEVA Acuña

## 2024-12-03 DIAGNOSIS — R59.9 LYMPH NODES ENLARGED: Primary | ICD-10-CM

## 2024-12-03 NOTE — PROGRESS NOTES
I spoke with Ms. Madrigal via telephone regarding her ultrasound results.  At this time we are repeating her left axilla ultrasound along with her soft tissue of neck ultrasound in 6 to 8 weeks for comparison as recommended.  If lymph nodes are unchanged or growing in size we will refer to hematology oncology at that time.  During our phone conversation Ms. Madrigal denies growth of left axilla or neck mass.  They are not tender she denies night sweats.  We discussed her hemoglobin levels and recent lower levels being due to her birth control causing heavy menorrhea. We discussed possible differentials during our conversation and she is wanting to repeat imaging at this time as recommended.   Thank you,   NEVA Acuña

## 2025-01-09 ENCOUNTER — HOSPITAL ENCOUNTER (OUTPATIENT)
Dept: ULTRASOUND IMAGING | Facility: HOSPITAL | Age: 25
Discharge: HOME | End: 2025-01-09
Payer: COMMERCIAL

## 2025-01-09 DIAGNOSIS — R59.9 LYMPH NODES ENLARGED: ICD-10-CM

## 2025-01-09 PROCEDURE — 76536 US EXAM OF HEAD AND NECK: CPT

## 2025-01-09 PROCEDURE — 76882 US LMTD JT/FCL EVL NVASC XTR: CPT

## 2025-01-10 NOTE — PROGRESS NOTES
Ms Diane,   Your Ultrasound of your left axilla lymph node and your neck appears to be unchanged from your previous Ultrasound. If it is still present in one year we can repeat the US for monitoring purposes.     If either place increases, increases or becomes painful or red please notify your PCP for evaluation.  Please let us know if you have any further questions or concerns.  Thank you for allowing us to care for you,  NEVA Acuña

## 2025-02-04 ENCOUNTER — OFFICE VISIT (OUTPATIENT)
Dept: INTERNAL MEDICINE | Age: 25
End: 2025-02-04
Payer: COMMERCIAL

## 2025-02-04 VITALS
DIASTOLIC BLOOD PRESSURE: 86 MMHG | TEMPERATURE: 97.2 F | HEART RATE: 69 BPM | BODY MASS INDEX: 31.58 KG/M2 | OXYGEN SATURATION: 100 % | WEIGHT: 185 LBS | HEIGHT: 64 IN | SYSTOLIC BLOOD PRESSURE: 126 MMHG

## 2025-02-04 DIAGNOSIS — Z00.00 ENCOUNTER FOR ANNUAL PHYSICAL EXAM: Primary | ICD-10-CM

## 2025-02-04 DIAGNOSIS — Z91.09 ENVIRONMENTAL ALLERGIES: Chronic | ICD-10-CM

## 2025-02-04 PROCEDURE — 99395 PREV VISIT EST AGE 18-39: CPT | Performed by: NURSE PRACTITIONER

## 2025-02-04 RX ORDER — FLUTICASONE PROPIONATE 50 MCG
2 SPRAY, SUSPENSION (ML) NASAL NIGHTLY
Qty: 16 G | Refills: 5 | Status: SHIPPED | OUTPATIENT
Start: 2025-02-04

## 2025-02-04 RX ORDER — AZELASTINE HYDROCHLORIDE 137 UG/1
1 SPRAY, METERED NASAL DAILY
Qty: 30 ML | Refills: 1 | Status: SHIPPED | OUTPATIENT
Start: 2025-02-04

## 2025-02-04 RX ORDER — HYDROXYZINE HYDROCHLORIDE 25 MG/1
25 TABLET, FILM COATED ORAL DAILY PRN
Qty: 50 TABLET | Refills: 2 | Status: SHIPPED | OUTPATIENT
Start: 2025-02-04

## 2025-02-04 NOTE — PROGRESS NOTES
"                     S K Y L E R    F R Y E ,   D N P ,  A P R N                  I  N  T  E  R  N  A  L    M  E  D  I  C  I  N  E       ENCOUNTER DATE:  02/04/2025    Susan Garciazer / 24 y.o. / female    ANNUAL PREVENTATIVE / PHYSICAL ENCOUNTER       CHIEF COMPLAINT     Annual Exam      VITALS     Vitals:    02/04/25 1134   BP: 126/86   Pulse: 69   Temp: 97.2 °F (36.2 °C)   SpO2: 100%   Weight: 83.9 kg (185 lb)   Height: 162.6 cm (64.02\")       BP Readings from Last 3 Encounters:   02/04/25 126/86   11/14/24 120/81   11/04/24 116/82     Wt Readings from Last 3 Encounters:   02/04/25 83.9 kg (185 lb)   11/04/24 80.7 kg (178 lb)   06/10/24 77.1 kg (170 lb)      Body mass index is 31.74 kg/m².      MEDICATIONS     Current Outpatient Medications on File Prior to Visit   Medication Sig Dispense Refill    fluocinonide (LIDEX) 0.05 % external solution APPLY SEVERAL DROPS TO SCALP DAILY FOR UP TO TWO WEEKS THEN AS NEEDED      Ginny 0.25-35 MG-MCG per tablet       spironolactone (ALDACTONE) 100 MG tablet Take 1 tablet by mouth Daily.      [DISCONTINUED] Azelastine HCl 137 MCG/SPRAY solution INSTILL 1 SPRAY INTO THE NOSTRIL(S) AS DIRECTED BY PROVIDER 2 (TWO) TIMES A DAY. 30 mL 1    [DISCONTINUED] fluticasone (FLONASE) 50 MCG/ACT nasal spray 2 sprays into the nostril(s) as directed by provider Every Night. 48 mL 2    [DISCONTINUED] hydrOXYzine (ATARAX) 25 MG tablet TAKE 1 TABLET BY MOUTH THREE TIMES A DAY AS NEEDED FOR ITCHING 50 tablet 2    cyclobenzaprine (FLEXERIL) 5 MG tablet Take 1 tablet by mouth 2 (Two) Times a Day As Needed for Muscle Spasms. 8 tablet 0     No current facility-administered medications on file prior to visit.         HISTORY OF PRESENT ILLNESS     Susan presents for annual health maintenance visit.    Patient presents to establish care with new provider in office.      She recently had a Pap smear with all women in December.  She is on hormonal birth control.    ENT has evaluated her for deviated " septum in the past.  She has not pursue surgery due to cost.    Depression in the past, no longer on Zoloft.  Stable at this time.    [facility]  They prescribed her spironolactone for acne.    Environmental allergies overall stable with Flonase, azelastine and hydroxyzine.  She lives with her boyfriend who has cats in which she is allergic to.      Patient Care Team:  Marcus Mckinnon DNP, APRN as PCP - General (Internal Medicine)  Chyna Jeronimo APRN as Nurse Practitioner (Obstetrics and Gynecology)    General health: good  Lifestyle:  Attempting to lose weight?: Yes   Diet: eats moderately healthy  Exercise: generally stays active (but no regular exercise)  Tobacco: Never used   Alcohol: does not drink  Work: Full-time  Reproductive health:  Sexually active?: No   Sexual problems?: No problems  Concern for STD?: No    Sees Gynecologist?: Yes   Velvet/Postmenopausal?: No   Depression Screening:        PHQ-2 Depression Screening  Little interest or pleasure in doing things? Not at all   Feeling down, depressed, or hopeless? Not at all   PHQ-2 Total Score 0        PHQ-2: 0 (Not depressed)   PHQ-9: 0 (Negative screening for depression)    ALLERGIES  Allergies   Allergen Reactions    Cephalexin Rash        PFSH:     The following portions of the patient's history were reviewed and updated as appropriate: Allergies / Current Medications / Past Medical History / Surgical History / Social History / Family History    PROBLEM LIST   Patient Active Problem List   Diagnosis    Anxiety    Dysmenorrhea    Finger lesion    Gastroesophageal reflux disease    Right facial numbness    Obesity with body mass index 30 or greater    Environmental allergies    Poison ivy dermatitis    Mixed hyperlipidemia    Other specified anemias    Acute non-recurrent frontal sinusitis    Acne vulgaris       PAST MEDICAL HISTORY  Past Medical History:   Diagnosis Date    Acne     Allergic     Anxiety      Depression 2018    Lymphadenopathy        SURGICAL HISTORY  Past Surgical History:   Procedure Laterality Date    WISDOM TOOTH EXTRACTION         SOCIAL HISTORY  Social History     Socioeconomic History    Marital status: Single   Tobacco Use    Smoking status: Never     Passive exposure: Never    Smokeless tobacco: Never   Vaping Use    Vaping status: Never Used   Substance and Sexual Activity    Alcohol use: Never    Drug use: Never    Sexual activity: Not Currently     Partners: Male     Birth control/protection: Birth control pill       FAMILY HISTORY  Family History   Problem Relation Age of Onset    Arthritis Mother     Depression Mother     Hyperlipidemia Mother     Hypertension Mother     Alcohol abuse Father         Estranged father    Depression Maternal Grandmother     Hyperlipidemia Maternal Grandmother     Diabetes Maternal Grandfather     Heart disease Maternal Grandfather          of Congestive Heart Failure    Cancer Paternal Grandfather          of Lung Cancer       IMMUNIZATION HISTORY  Immunization History   Administered Date(s) Administered    DTaP 1998, 1998, 01/15/1999, 07/10/2002    HPV Quadrivalent 2009, 09/15/2009, 2010    Hep A, 2 Dose 2009, 2010    Hep B, Adolescent or Pediatric 1998, 1999, 1999    Hepatitis A 2018, 2018    Hib (PRP-OMP) 1998, 1998, 01/15/1999, 10/19/1999    IPV 1998, 1998, 10/19/1999, 07/10/2002    MMR 1999, 07/10/2002    Meningococcal MCV4P (Menactra) 2009, 2016    Pneumococcal Conjugate 13-Valent (PCV13) 2000    Tdap 2009, 2019    Varicella 1999, 2009         REVIEW OF SYSTEMS     Review of Systems   Constitutional: Negative.    HENT: Negative.     Eyes: Negative.    Respiratory: Negative.     Cardiovascular: Negative.    Gastrointestinal: Negative.    Endocrine: Negative.    Genitourinary: Negative.    Musculoskeletal:  Negative.    Skin: Negative.    Allergic/Immunologic: Negative.    Neurological: Negative.    Hematological: Negative.    Psychiatric/Behavioral: Negative.       PHYSICAL EXAMINATION     Physical Exam  Constitutional:       Appearance: Normal appearance.   HENT:      Head: Normocephalic and atraumatic.      Right Ear: Tympanic membrane normal.      Left Ear: Tympanic membrane normal.      Nose: Nose normal. No congestion.      Mouth/Throat:      Mouth: Mucous membranes are moist.      Pharynx: Oropharynx is clear.   Eyes:      Conjunctiva/sclera: Conjunctivae normal.      Pupils: Pupils are equal, round, and reactive to light.   Neck:      Thyroid: No thyromegaly.      Vascular: No carotid bruit.   Cardiovascular:      Rate and Rhythm: Normal rate and regular rhythm.      Pulses: Normal pulses.      Heart sounds: Normal heart sounds.   Pulmonary:      Effort: Pulmonary effort is normal.      Breath sounds: Normal breath sounds.   Abdominal:      Palpations: Abdomen is soft.      Tenderness: There is no abdominal tenderness. There is no right CVA tenderness, left CVA tenderness or guarding.   Genitourinary:     Comments: Followed by OB/GYN  Musculoskeletal:         General: Normal range of motion.      Cervical back: Normal range of motion.      Right lower leg: No edema.      Left lower leg: No edema.   Lymphadenopathy:      Cervical: No cervical adenopathy.   Skin:     General: Skin is warm and dry.   Neurological:      Mental Status: She is alert and oriented to person, place, and time.      Cranial Nerves: No cranial nerve deficit.      Motor: No weakness.      Gait: Gait normal.      Deep Tendon Reflexes:      Reflex Scores:       Patellar reflexes are 2+ on the right side and 2+ on the left side.  Psychiatric:         Mood and Affect: Mood normal.         Behavior: Behavior normal.         Thought Content: Thought content normal.         Judgment: Judgment normal.       REVIEWED DATA      Labs:    Lab Results  "  Component Value Date     01/29/2024    K 4.6 01/29/2024    CALCIUM 9.1 01/29/2024    AST 14 01/29/2024    ALT 17 01/29/2024    BUN 12 01/29/2024    CREATININE 0.48 (L) 01/29/2024    CREATININE 0.51 (L) 01/17/2023    CREATININE 0.7 06/22/2021     Lab Results   Component Value Date    GLUCOSE 88 01/29/2024    TSH 1.840 01/29/2024     Lab Results   Component Value Date     (H) 01/29/2024    HDL 37 (L) 01/29/2024    TRIG 169 (H) 01/29/2024     Lab Results   Component Value Date    NBUR53FM 35 06/22/2021      Lab Results   Component Value Date    WBC 7.25 11/04/2024    HGB 12.3 11/04/2024    MCV 88.8 11/04/2024     11/04/2024   No results found for: \"PROTEIN\", \"GLUCOSEU\", \"BLOODU\", \"NITRITEU\", \"LEUKOCYTESUR\"  Lab Results   Component Value Date    HEPCVIRUSABY <0.1 01/17/2023       Imaging:                Medical Tests:              Summary of old records / correspondence / consultant report:               Request outside records:         ASSESSMENT & PLAN     ANNUAL WELLNESS EXAM / PHYSICAL     Other medical problems addressed today:  Problem List Items Addressed This Visit       Environmental allergies (Chronic)    Relevant Medications    Azelastine HCl 137 MCG/SPRAY solution    hydrOXYzine (ATARAX) 25 MG tablet    fluticasone (FLONASE) 50 MCG/ACT nasal spray     Other Visit Diagnoses       Encounter for annual physical exam    -  Primary    Relevant Orders    Comprehensive Metabolic Panel    Lipid Panel With / Chol / HDL Ratio    CBC & Differential    Hemoglobin A1c    TSH+Free T4    Urinalysis With Culture If Indicated - Urine, Clean Catch             Summary/Discussion:     Continue all current medications and specialty care.  BMI is >= 30 and <35. (Class 1 Obesity). The following options were offered after discussion;: exercise counseling/recommendations and nutrition counseling/recommendations     Next Appointment with me: Visit date not found    Return in about 1 year (around 2/4/2026) for " Annual physical with labs same day .      HEALTHCARE MAINTENANCE ISSUES     Cancer Screening:  Colon: Initial/Next screening at age: 43  Repeat colon cancer screening: N/A at this time  Breast: Recommended monthly self exams; annual professional exam  Mammogram: N/A at this time  Cervical: pelvic exam as recommended by gyne  Skin: Monthly self skin examination, annual exam by health professional  Lung: Does not meet criteria for lung cancer screening.   Other:    Screening Labs & Tests:  Lab results reviewed & discussed with the patient or test orders placed today.  EKG:  CV Screening: Lipid panel  DEXA (65+ or postmenopausal with risk factors):   HEP C (If born [birth year], or risk factors): Negative screen  Other:     Immunization/Vaccinations (to be given today unless deferred by patient)  Influenza: Patient had the flu shot this season  Hepatitis A: Up to date  Hepatitis B: Up to date  Tetanus/Pertussis: Up to date  Pneumococcal: Not needed at this time  Shingles: Not needed at this time  COVID: Does not plan to get the latest booster  RSV: Not indicated    Lifestyle Counseling:  Lifestyle Modifications: Improve dietary compliance and Increase intensity/regularity of aerobic exercise  Safety Issues: Always wear seatbelt, Avoid texting while driving   Use sunscreen, regular skin examination  Recommended annual dental/vision examination.  Emotional/Stress/Sleep: Reviewed and  given when appropriate      Health Maintenance   Topic Date Due    PAP SMEAR  Never done    ANNUAL PHYSICAL  01/29/2025    LIPID PANEL  01/29/2025    BMI FOLLOWUP  01/29/2025    COVID-19 Vaccine (1 - 2024-25 season) 02/06/2025 (Originally 9/1/2024)    INFLUENZA VACCINE  03/31/2025 (Originally 7/1/2024)    TDAP/TD VACCINES (3 - Td or Tdap) 06/24/2029    HEPATITIS C SCREENING  Completed    Pneumococcal Vaccine 0-64  Completed    HPV VACCINES  Completed

## 2025-02-05 ENCOUNTER — PATIENT ROUNDING (BHMG ONLY) (OUTPATIENT)
Dept: INTERNAL MEDICINE | Age: 25
End: 2025-02-05
Payer: COMMERCIAL

## 2025-02-05 NOTE — PROGRESS NOTES
A [employer] Not[employer] message has been sent to the patient for [place] [facility] [place]

## 2025-02-07 LAB
ALBUMIN SERPL-MCNC: 4 G/DL (ref 3.5–5.2)
ALBUMIN/GLOB SERPL: 1.1 G/DL
ALP SERPL-CCNC: 41 U/L (ref 39–117)
ALT SERPL-CCNC: 11 U/L (ref 1–33)
APPEARANCE UR: ABNORMAL
AST SERPL-CCNC: 14 U/L (ref 1–32)
BACTERIA #/AREA URNS HPF: ABNORMAL /[HPF]
BACTERIA UR CULT: NORMAL
BACTERIA UR CULT: NORMAL
BASOPHILS # BLD AUTO: 0.02 10*3/MM3 (ref 0–0.2)
BASOPHILS NFR BLD AUTO: 0.4 % (ref 0–1.5)
BILIRUB SERPL-MCNC: 0.5 MG/DL (ref 0–1.2)
BILIRUB UR QL STRIP: NEGATIVE
BUN SERPL-MCNC: 10 MG/DL (ref 6–20)
BUN/CREAT SERPL: 17.9 (ref 7–25)
CALCIUM SERPL-MCNC: 9.6 MG/DL (ref 8.6–10.5)
CASTS URNS QL MICRO: ABNORMAL /LPF
CHLORIDE SERPL-SCNC: 103 MMOL/L (ref 98–107)
CHOLEST SERPL-MCNC: 318 MG/DL (ref 0–200)
CHOLEST/HDLC SERPL: 5.13 {RATIO}
CO2 SERPL-SCNC: 19.5 MMOL/L (ref 22–29)
COLOR UR: YELLOW
CREAT SERPL-MCNC: 0.56 MG/DL (ref 0.57–1)
CRYSTALS URNS MICRO: ABNORMAL
EGFRCR SERPLBLD CKD-EPI 2021: 129.3 ML/MIN/1.73
EOSINOPHIL # BLD AUTO: 0.04 10*3/MM3 (ref 0–0.4)
EOSINOPHIL NFR BLD AUTO: 0.8 % (ref 0.3–6.2)
EPI CELLS #/AREA URNS HPF: >10 /HPF (ref 0–10)
ERYTHROCYTE [DISTWIDTH] IN BLOOD BY AUTOMATED COUNT: 12.9 % (ref 12.3–15.4)
GLOBULIN SER CALC-MCNC: 3.5 GM/DL
GLUCOSE SERPL-MCNC: 83 MG/DL (ref 65–99)
GLUCOSE UR QL STRIP: NEGATIVE
HBA1C MFR BLD: 5.1 % (ref 4.8–5.6)
HCT VFR BLD AUTO: 38.5 % (ref 34–46.6)
HDLC SERPL-MCNC: 62 MG/DL (ref 40–60)
HGB BLD-MCNC: 12.2 G/DL (ref 12–15.9)
HGB UR QL STRIP: NEGATIVE
IMM GRANULOCYTES # BLD AUTO: 0.01 10*3/MM3 (ref 0–0.05)
IMM GRANULOCYTES NFR BLD AUTO: 0.2 % (ref 0–0.5)
KETONES UR QL STRIP: NEGATIVE
LDLC SERPL CALC-MCNC: 214 MG/DL (ref 0–100)
LEUKOCYTE ESTERASE UR QL STRIP: ABNORMAL
LYMPHOCYTES # BLD AUTO: 1.57 10*3/MM3 (ref 0.7–3.1)
LYMPHOCYTES NFR BLD AUTO: 32.1 % (ref 19.6–45.3)
MCH RBC QN AUTO: 28.2 PG (ref 26.6–33)
MCHC RBC AUTO-ENTMCNC: 31.7 G/DL (ref 31.5–35.7)
MCV RBC AUTO: 89.1 FL (ref 79–97)
MICRO URNS: ABNORMAL
MONOCYTES # BLD AUTO: 0.32 10*3/MM3 (ref 0.1–0.9)
MONOCYTES NFR BLD AUTO: 6.5 % (ref 5–12)
MUCOUS THREADS URNS QL MICRO: PRESENT
NEUTROPHILS # BLD AUTO: 2.93 10*3/MM3 (ref 1.7–7)
NEUTROPHILS NFR BLD AUTO: 60 % (ref 42.7–76)
NITRITE UR QL STRIP: NEGATIVE
NRBC BLD AUTO-RTO: 0 /100 WBC (ref 0–0.2)
PH UR STRIP: 5 [PH] (ref 5–7.5)
PLATELET # BLD AUTO: 182 10*3/MM3 (ref 140–450)
POTASSIUM SERPL-SCNC: 4 MMOL/L (ref 3.5–5.2)
PROT SERPL-MCNC: 7.5 G/DL (ref 6–8.5)
PROT UR QL STRIP: NEGATIVE
RBC # BLD AUTO: 4.32 10*6/MM3 (ref 3.77–5.28)
RBC #/AREA URNS HPF: ABNORMAL /HPF (ref 0–2)
SODIUM SERPL-SCNC: 137 MMOL/L (ref 136–145)
SP GR UR STRIP: 1.02 (ref 1–1.03)
T4 FREE SERPL-MCNC: 1.12 NG/DL (ref 0.92–1.68)
TRIGL SERPL-MCNC: 215 MG/DL (ref 0–150)
TSH SERPL DL<=0.005 MIU/L-ACNC: 1.75 UIU/ML (ref 0.27–4.2)
UNIDENT CRYS URNS QL MICRO: PRESENT
URINALYSIS REFLEX: ABNORMAL
UROBILINOGEN UR STRIP-MCNC: 0.2 MG/DL (ref 0.2–1)
VLDLC SERPL CALC-MCNC: 42 MG/DL (ref 5–40)
WBC # BLD AUTO: 4.89 10*3/MM3 (ref 3.4–10.8)
WBC #/AREA URNS HPF: ABNORMAL /HPF (ref 0–5)

## 2025-02-10 DIAGNOSIS — E78.5 HYPERLIPIDEMIA, UNSPECIFIED HYPERLIPIDEMIA TYPE: Primary | ICD-10-CM

## 2025-02-14 DIAGNOSIS — Z91.09 ENVIRONMENTAL ALLERGIES: Chronic | ICD-10-CM

## 2025-02-14 RX ORDER — HYDROXYZINE HYDROCHLORIDE 10 MG/1
10 TABLET, FILM COATED ORAL 3 TIMES DAILY PRN
Qty: 50 TABLET | Refills: 1 | Status: SHIPPED | OUTPATIENT
Start: 2025-02-14

## 2025-03-11 DIAGNOSIS — Z91.09 ENVIRONMENTAL ALLERGIES: Chronic | ICD-10-CM

## 2025-03-11 RX ORDER — AZELASTINE HYDROCHLORIDE 137 UG/1
1 SPRAY, METERED NASAL DAILY
Qty: 90 ML | Refills: 1 | Status: SHIPPED | OUTPATIENT
Start: 2025-03-11

## 2025-03-26 ENCOUNTER — TELEPHONE (OUTPATIENT)
Dept: INTERNAL MEDICINE | Age: 25
End: 2025-03-26

## 2025-03-26 NOTE — TELEPHONE ENCOUNTER
Caller: Susan Contreras    Relationship to patient: Self    Best call back number: [phone]    Patient is needing: PATIENT STATES THAT HER BOYFRIEND HAS BEEN DIAGNOSED WITH BRONCHITIS AND SHE WOULD LIKE TO KNOW WHAT SHE SHOULD DO TO MAKE SURE SHE DOESN'T GET IT, OR SHOULD SHE START SOME ANTIBIOTICS JUST INCASE? PLEASE REACH OUT TO ADVISE

## 2025-03-28 NOTE — TELEPHONE ENCOUNTER
Name: Susan Contreras      Relationship: Self      Best Callback Number: [phone]       HUB PROVIDED THE RELAY MESSAGE FROM THE OFFICE      PATIENT: VOICED UNDERSTANDING AND HAS NO FURTHER QUESTIONS AT THIS TIME    ADDITIONAL INFORMATION:

## 2025-07-01 ENCOUNTER — OFFICE VISIT (OUTPATIENT)
Dept: INTERNAL MEDICINE | Age: 25
End: 2025-07-01
Payer: COMMERCIAL

## 2025-07-01 VITALS
HEIGHT: 64 IN | WEIGHT: 176 LBS | OXYGEN SATURATION: 98 % | SYSTOLIC BLOOD PRESSURE: 118 MMHG | TEMPERATURE: 97.4 F | HEART RATE: 64 BPM | BODY MASS INDEX: 30.05 KG/M2 | DIASTOLIC BLOOD PRESSURE: 78 MMHG

## 2025-07-01 DIAGNOSIS — R59.0 LYMPHADENOPATHY, AXILLARY: Primary | ICD-10-CM

## 2025-07-01 PROCEDURE — 99213 OFFICE O/P EST LOW 20 MIN: CPT | Performed by: NURSE PRACTITIONER

## 2025-07-22 ENCOUNTER — HOSPITAL ENCOUNTER (OUTPATIENT)
Dept: ULTRASOUND IMAGING | Facility: HOSPITAL | Age: 25
Discharge: HOME | End: 2025-07-22
Admitting: NURSE PRACTITIONER
Payer: COMMERCIAL

## 2025-07-22 DIAGNOSIS — R59.0 LYMPHADENOPATHY, AXILLARY: ICD-10-CM

## 2025-07-22 DIAGNOSIS — Z91.09 ENVIRONMENTAL ALLERGIES: Chronic | ICD-10-CM

## 2025-07-22 PROCEDURE — 76882 US LMTD JT/FCL EVL NVASC XTR: CPT

## 2025-07-22 RX ORDER — HYDROXYZINE HYDROCHLORIDE 10 MG/1
TABLET, FILM COATED ORAL
Qty: 90 TABLET | Refills: 0 | Status: SHIPPED | OUTPATIENT
Start: 2025-07-22

## 2025-08-19 ENCOUNTER — OFFICE VISIT (OUTPATIENT)
Dept: INTERNAL MEDICINE | Age: 25
End: 2025-08-19
Payer: COMMERCIAL

## 2025-08-19 ENCOUNTER — TELEPHONE (OUTPATIENT)
Dept: INTERNAL MEDICINE | Age: 25
End: 2025-08-19

## 2025-08-19 VITALS
WEIGHT: 175.6 LBS | BODY MASS INDEX: 29.98 KG/M2 | HEART RATE: 63 BPM | SYSTOLIC BLOOD PRESSURE: 112 MMHG | HEIGHT: 64 IN | OXYGEN SATURATION: 99 % | TEMPERATURE: 97.4 F | DIASTOLIC BLOOD PRESSURE: 74 MMHG

## 2025-08-19 DIAGNOSIS — R59.0 AXILLARY LYMPHADENOPATHY: Primary | ICD-10-CM

## 2025-08-19 PROCEDURE — 99213 OFFICE O/P EST LOW 20 MIN: CPT | Performed by: NURSE PRACTITIONER
